# Patient Record
Sex: MALE | Employment: OTHER | URBAN - METROPOLITAN AREA
[De-identification: names, ages, dates, MRNs, and addresses within clinical notes are randomized per-mention and may not be internally consistent; named-entity substitution may affect disease eponyms.]

---

## 2019-07-10 LAB — PSA, EXTERNAL: 0.54

## 2019-07-12 ENCOUNTER — OFFICE VISIT (OUTPATIENT)
Dept: NEUROLOGY | Age: 74
End: 2019-07-12

## 2019-07-12 VITALS
OXYGEN SATURATION: 100 % | BODY MASS INDEX: 31.44 KG/M2 | HEART RATE: 78 BPM | WEIGHT: 245 LBS | DIASTOLIC BLOOD PRESSURE: 60 MMHG | RESPIRATION RATE: 16 BRPM | HEIGHT: 74 IN | SYSTOLIC BLOOD PRESSURE: 110 MMHG

## 2019-07-12 DIAGNOSIS — E08.42 DIABETIC POLYNEUROPATHY ASSOCIATED WITH DIABETES MELLITUS DUE TO UNDERLYING CONDITION (HCC): Primary | ICD-10-CM

## 2019-07-12 DIAGNOSIS — G62.2 POLYNEUROPATHY DUE TO OTHER TOXIC AGENTS (HCC): ICD-10-CM

## 2019-07-12 RX ORDER — LISINOPRIL 20 MG/1
TABLET ORAL DAILY
COMMUNITY
End: 2020-08-12

## 2019-07-12 RX ORDER — GLUCOSAMINE SULFATE 1500 MG
POWDER IN PACKET (EA) ORAL DAILY
COMMUNITY

## 2019-07-12 RX ORDER — LANOLIN ALCOHOL/MO/W.PET/CERES
1000 CREAM (GRAM) TOPICAL DAILY
COMMUNITY

## 2019-07-12 RX ORDER — GLIPIZIDE 5 MG/1
TABLET ORAL 2 TIMES DAILY
COMMUNITY
End: 2020-06-03 | Stop reason: DRUGHIGH

## 2019-07-12 RX ORDER — SIMVASTATIN 20 MG/1
TABLET, FILM COATED ORAL
COMMUNITY
End: 2020-10-16

## 2019-07-12 RX ORDER — METFORMIN HYDROCHLORIDE 1000 MG/1
1000 TABLET ORAL 2 TIMES DAILY WITH MEALS
COMMUNITY
End: 2020-10-16

## 2019-07-12 RX ORDER — SILDENAFIL 50 MG/1
50 TABLET, FILM COATED ORAL AS NEEDED
COMMUNITY
End: 2020-06-03 | Stop reason: SDUPTHER

## 2019-07-12 NOTE — PROGRESS NOTES
Chief Complaint   Patient presents with    Neurologic Problem    Numbness       Referred by: Dr. Bola GUILLEN    Mr. Davion Elam is a 79-year-old gentleman, retired  in [address] [place] [address], here for neuropathy. He tells me that he has had diabetes for almost 30 years close to 20 years. He has no feeling in his feet and ankles also his hands. He is taken a few falls because he feels imbalanced. He also thinks his memory is not so good because he forgets people's names. He has no radicular pain. He takes oral medication to control his diabetes and he tells me last A1c was very stable. He also has evidence of neuropathy possibly contributed to by agent orange based on his [employer] compensation. Review of Systems   Musculoskeletal: Positive for falls. Negative for back pain and neck pain. Neurological: Positive for tingling and sensory change. Psychiatric/Behavioral: Positive for memory loss. All other systems reviewed and are negative.       Past Medical History:   Diagnosis Date    Arthritis     Diabetes ([facility] [address]     Heart disease     Hypertension      Family History   Problem Relation Age of Onset    Cancer Mother     Cancer Father      Social History     Socioeconomic History    Marital status: Not on file     Spouse name: Not on file    Number of children: Not on file    Years of education: Not on file    Highest education level: Not on file   Occupational History    Not on file   Social Needs    Financial resource strain: Not on file    Food insecurity:     Worry: Not on file     Inability: Not on file    Transportation needs:     Medical: Not on file     Non-medical: Not on file   Tobacco Use    Smoking status: Current Every Day Smoker     Packs/day: 0.25    Smokeless tobacco: Never Used   Substance and Sexual Activity    Alcohol use: Never     Frequency: Never    Drug use: Not on file    Sexual activity: Not on file   Lifestyle    Physical activity:     Days per week: Not on file     Minutes per session: Not on file    Stress: Not on file   Relationships    Social connections:     Talks on phone: Not on file     Gets together: Not on file     Attends Rastafari service: Not on file     Active member of club or organization: Not on file     Attends meetings of clubs or organizations: Not on file     Relationship status: Not on file    Intimate partner violence:     Fear of current or ex partner: Not on file     Emotionally abused: Not on file     Physically abused: Not on file     Forced sexual activity: Not on file   Other Topics Concern    Not on file   Social History Narrative    Not on file     Current Outpatient Medications   Medication Sig    metFORMIN (GLUCOPHAGE) 1,000 mg tablet Take 1,000 mg by mouth two (2) times daily (with meals).  lisinopril (PRINIVIL, ZESTRIL) 20 mg tablet Take  by mouth daily.  glipiZIDE (GLUCOTROL) 5 mg tablet Take  by mouth two (2) times a day.  simvastatin (ZOCOR) 20 mg tablet Take  by mouth nightly.  sildenafil citrate (VIAGRA) 50 mg tablet Take 50 mg by mouth as needed.  cyanocobalamin 1,000 mcg tablet Take 1,000 mcg by mouth daily.  cholecalciferol (VITAMIN D3) 1,000 unit cap Take  by mouth daily. No current facility-administered medications for this visit. Allergies   Allergen Reactions    Pollen Extracts Runny Nose         Neurologic Exam     Mental Status   Oriented to person, place, and time. Very talkative     Cranial Nerves   Cranial nerves II through XII intact.      Motor Exam   Muscle bulk: decreased5/5 throughout with mild decreased bulk distally     Sensory Exam   Right arm light touch: decreased from fingers  Left arm light touch: decreased from fingers    Gait, Coordination, and Reflexes     Gait  Gait: (A little bit wide but good tempo)    Coordination   Romberg: positive  Finger to nose coordination: normal    Tremor   Resting tremor: absentAbsent-trace reflexes throughout     Physical Exam Constitutional: He is oriented to person, place, and time. He appears well-developed and well-nourished. Cardiovascular: Normal rate. Pulmonary/Chest: Effort normal.   Neurological: He is oriented to person, place, and time. He has an abnormal Romberg Test. He has a normal Finger-Nose-Finger Test.   Skin: Skin is warm and dry. Psychiatric: His behavior is normal.   Vitals reviewed. Visit Vitals  /60 (BP 1 Location: Right arm, BP Patient Position: Sitting)   Pulse 78   Resp 16   Ht 6' 2\" (1.88 m)   Wt 111.1 kg (245 lb)   SpO2 100%   BMI 31.46 kg/m²       No labs to review       Assessment and Plan   Diagnoses and all orders for this visit:    1. Diabetic polyneuropathy associated with diabetes mellitus due to underlying condition ([facility]    2. 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 No reflexes present. Some mild symmetric atrophy distally. Strength is intact which is reassuring. He needs to stay physically active daily as much as possible. He is a significant falls risk. He should be using a walking stick or a walking cane for stability. He is not having any pain so will defer any medications. I discussed with him that neuropathy could worsen over the years so he needs to to be vigilant about maintaining physical activity daily and controlling his diabetes. Uncontrolled diabetes also will accelerate memory loss in the development of dementia. Watch for any changes. Recommend he follow-up sometime next year. A notice of this visit/encounter being completed has been sent electronically to the patient's PCP and/or referring provider.      2 [facility], [address]  Diplomate JENNIFER

## 2019-07-12 NOTE — PATIENT INSTRUCTIONS
[facility]   Statement to Patients  April 1, 2014      In an effort to ensure the large volume of patient prescription refills is processed in the most efficient and expeditious manner, we are asking our patients to assist us by calling your Pharmacy for all prescription refills, this will include also your  Mail Order Pharmacy. The pharmacy will contact our office electronically to continue the refill process. Please do not wait until the last minute to call your pharmacy. We need at least 48 hours (2days) to fill prescriptions. We also encourage you to call your pharmacy before going to  your prescription to make sure it is ready. With regard to controlled substance prescription refill requests (narcotic refills) that need to be picked up at our office, we ask your cooperation by providing us with at least 72 hours (3days) notice that you will need a refill. We will not refill narcotic prescription refill requests after 4:00pm on any weekday, Monday through Thursday, or after 2:00pm on Fridays, or on the weekends. We encourage everyone to explore another way of getting your prescription refill request processed using [employer], our patient web portal through our electronic medical record system. [employer] is an efficient and effective way to communicate your medication request directly to the office and  downloadable as an ladonna on your smart phone . [employer] also features a review functionality that allows you to view your medication list as well as leave messages for your physician. Are you ready to get connected? If so please review the attatched instructions or speak to any of our staff to get you set up right away! Thank you so much for your cooperation. Should you have any questions please contact our Practice Administrator.     The Physicians and Staff,  [address] [place] [address] Neurology Clinic

## 2019-07-12 NOTE — LETTER
7/12/2019 Patient:  Wyane Collier YOB: 1945 Date of Visit: 7/12/2019 Dear Rey Whaley MD 
[address] [id number] VIA Facsimile: [phone] 
 : 
 
 
I was requested by Nishant Bravo MD to evaluate Mr. Caleb Borrero  for Chief Complaint Patient presents with  Neurologic Problem  Numbness [facility] I am recommending the following:  
 
Diagnoses and all orders for this visit: 1. Diabetic polyneuropathy associated with diabetes mellitus due to underlying condition ([facility] 2. 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 
 
 
 
---------------------------------------------------------------------------------------------------------------------- Below is my encounter: Chief Complaint Patient presents with  Neurologic Problem  Numbness Referred by: Dr. Dauna Landau HPI Mr. Jadon Boothe is a 79-year-old gentleman, retired  in [address] [place] [address], here for neuropathy. He tells me that he has had diabetes for almost 30 years close to 20 years. He has no feeling in his feet and ankles also his hands. He is taken a few falls because he feels imbalanced. He also thinks his memory is not so good because he forgets people's names. He has no radicular pain. He takes oral medication to control his diabetes and he tells me last A1c was very stable. He also has evidence of neuropathy possibly contributed to by agent orange based on his [employer] compensation. Review of Systems Musculoskeletal: Positive for falls. Negative for back pain and neck pain. Neurological: Positive for tingling and sensory change. Psychiatric/Behavioral: Positive for memory loss. All other systems reviewed and are negative. Past Medical History:  
Diagnosis Date  Arthritis  Diabetes ([facility]  Heart disease  Hypertension Family History Problem Relation Age of Onset  Cancer Mother  Cancer Father Social History Socioeconomic History  Marital status: Not on file Spouse name: Not on file  Number of children: Not on file  Years of education: Not on file  Highest education level: Not on file Occupational History  Not on file Social Needs  Financial resource strain: Not on file  Food insecurity:  
  Worry: Not on file Inability: Not on file  Transportation needs:  
  Medical: Not on file Non-medical: Not on file Tobacco Use  Smoking status: Current Every Day Smoker Packs/day: 0.25  Smokeless tobacco: Never Used Substance and Sexual Activity  Alcohol use: Never Frequency: Never  Drug use: Not on file  Sexual activity: Not on file Lifestyle  Physical activity:  
  Days per week: Not on file Minutes per session: Not on file  Stress: Not on file Relationships  Social connections:  
  Talks on phone: Not on file Gets together: Not on file Attends Muslim service: Not on file Active member of club or organization: Not on file Attends meetings of clubs or organizations: Not on file Relationship status: Not on file  Intimate partner violence:  
  Fear of current or ex partner: Not on file Emotionally abused: Not on file Physically abused: Not on file Forced sexual activity: Not on file Other Topics Concern  Not on file Social History Narrative  Not on file Current Outpatient Medications Medication Sig  
 metFORMIN (GLUCOPHAGE) 1,000 mg tablet Take 1,000 mg by mouth two (2) times daily (with meals).  lisinopril (PRINIVIL, ZESTRIL) 20 mg tablet Take  by mouth daily.  glipiZIDE (GLUCOTROL) 5 mg tablet Take  by mouth two (2) times a day.  simvastatin (ZOCOR) 20 mg tablet Take  by mouth nightly.  sildenafil citrate (VIAGRA) 50 mg tablet Take 50 mg by mouth as needed.  cyanocobalamin 1,000 mcg tablet Take 1,000 mcg by mouth daily.  cholecalciferol (VITAMIN D3) 1,000 unit cap Take  by mouth daily. No current facility-administered medications for this visit. Allergies Allergen Reactions  Pollen Extracts Runny Nose Neurologic Exam  
 
Mental Status Oriented to person, place, and time. Very talkative Cranial Nerves Cranial nerves II through XII intact. Motor Exam  
Muscle bulk: decreased5/5 throughout with mild decreased bulk distally Sensory Exam  
Right arm light touch: decreased from fingers Left arm light touch: decreased from fingers Gait, Coordination, and Reflexes Gait Gait: (A little bit wide but good tempo) Coordination Romberg: positive Finger to nose coordination: normal 
 
Tremor Resting tremor: absentAbsenttrace reflexes throughout Physical Exam  
Constitutional: He is oriented to person, place, and time. He appears well-developed and well-nourished. Cardiovascular: Normal rate. Pulmonary/Chest: Effort normal.  
Neurological: He is oriented to person, place, and time. He has an abnormal Romberg Test. He has a normal Finger-Nose-Finger Test.  
Skin: Skin is warm and dry. Psychiatric: His behavior is normal.  
Vitals reviewed. Visit Vitals /60 (BP 1 Location: Right arm, BP Patient Position: Sitting) Pulse 78 Resp 16 Ht 6' 2\" (1.88 m) Wt 111.1 kg (245 lb) SpO2 100% BMI 31.46 kg/m² No labs to review Assessment and Plan Diagnoses and all orders for this visit: 1. Diabetic polyneuropathy associated with diabetes mellitus due to underlying condition ([facility] 2. 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 No reflexes present. Some mild symmetric atrophy distally. Strength is intact which is reassuring.   He needs to stay physically active daily as much as possible. He is a significant falls risk. He should be using a walking stick or a walking cane for stability. He is not having any pain so will defer any medications. I discussed with him that neuropathy could worsen over the years so he needs to to be vigilant about maintaining physical activity daily and controlling his diabetes. Uncontrolled diabetes also will accelerate memory loss in the development of dementia. Watch for any changes. Recommend he follow-up sometime next year. Thank you for giving me the opportunity to assist in the care of Mr. Leeanne Guevara. If you have questions, please do not hesitate to contact me. Sincerely, [facility], DO Neurologist 
Brain Injury Medicine Diplomate ABPN

## 2019-11-07 LAB
LDL-C, EXTERNAL: 43
MICROALBUMIN UR TEST STR-MCNC: 39.4 MG/DL

## 2020-05-07 LAB
CREATININE, EXTERNAL: 1.2
HBA1C MFR BLD HPLC: 6.9 %

## 2020-06-03 ENCOUNTER — OFFICE VISIT (OUTPATIENT)
Dept: INTERNAL MEDICINE CLINIC | Age: 75
End: 2020-06-03

## 2020-06-03 VITALS
OXYGEN SATURATION: 95 % | RESPIRATION RATE: 18 BRPM | TEMPERATURE: 98 F | HEART RATE: 64 BPM | BODY MASS INDEX: 31.7 KG/M2 | WEIGHT: 247 LBS | SYSTOLIC BLOOD PRESSURE: 145 MMHG | HEIGHT: 74 IN | DIASTOLIC BLOOD PRESSURE: 72 MMHG

## 2020-06-03 DIAGNOSIS — I10 BENIGN ESSENTIAL HTN: ICD-10-CM

## 2020-06-03 DIAGNOSIS — G62.9 POLYNEUROPATHY: ICD-10-CM

## 2020-06-03 DIAGNOSIS — E11.42 DM TYPE 2 WITH DIABETIC PERIPHERAL NEUROPATHY (HCC): ICD-10-CM

## 2020-06-03 DIAGNOSIS — E11.40 TYPE 2 DIABETES MELLITUS WITH DIABETIC NEUROPATHY, WITHOUT LONG-TERM CURRENT USE OF INSULIN (HCC): ICD-10-CM

## 2020-06-03 DIAGNOSIS — E87.1 HYPONATREMIA: Primary | ICD-10-CM

## 2020-06-03 DIAGNOSIS — R26.89 IMBALANCE: ICD-10-CM

## 2020-06-03 RX ORDER — SILDENAFIL 100 MG/1
TABLET, FILM COATED ORAL
COMMUNITY
End: 2022-04-07 | Stop reason: ALTCHOICE

## 2020-06-03 RX ORDER — GLIPIZIDE 5 MG/1
5 TABLET, FILM COATED, EXTENDED RELEASE ORAL DAILY
Qty: 90 TAB | Refills: 3
Start: 2020-06-03 | End: 2020-10-16

## 2020-06-03 RX ORDER — CHIA/LINOLENIC/LINOLEIC/OLEIC 1000 MG
CAPSULE ORAL
COMMUNITY
End: 2021-07-07 | Stop reason: ALTCHOICE

## 2020-06-03 NOTE — PROGRESS NOTES
Nidia Laws is a 69 y.o. male    Chief Complaint   Patient presents with    New Patient    Diabetes       Visit Vitals  /88 (BP 1 Location: Right arm, BP Patient Position: Sitting)   Pulse 64   Temp 98 °F (36.7 °C) (Oral)   Resp 18   Ht 6' 2\" (1.88 m)   Wt 247 lb (112 kg)   SpO2 95%   BMI 31.71 kg/m²       1. Have you been to the ER, urgent care clinic since your last visit? Hospitalized since your last visit? No    2. Have you seen or consulted any other health care providers outside of the [address] [place] [address] since your last visit? Include any pap smears or colon screening. Cardiologist Dr. Reji Wyman. Last colonoscopy 10/2017 - Dr. Lukas Land.

## 2020-06-04 NOTE — PROGRESS NOTES
HISTORY OF PRESENT ILLNESS    New patient to my practice, referred to me by his wife's doctor, Dr. Elisa Vasquez. Prior medical care has been from Dr. Janneth Wang. He wanted to move here due to proximity. he works as a Retired  [employer] [employer] . his  past medical history was reviewed, discussed, and summarized in the list below. Doing well. Seeing [facility] as well. Hx of neuropathy of feet. No painful, but limits ability to feel while walking. Has has a few falls and is told to use a cane, but prefers not to use it all the time. S/s due to DM and possibly Agent orange exposure. Hx hyponatremia. Was 128 7/2019, but more recently 131 5/7/20. Was referred last month to a specialist but has not gone  Says he drinks a lot of water. He is active. Taking lisinopril. Diabetes Mellitus  Last hemoglobin a1c   Lab Results   Component Value Date/Time    Hemoglobin A1c, External 6.9 05/07/2020     No components found for: POCA1C, HBA1C POC  medication compliance: compliant all of the time. Diabetic diet compliance: compliant most of the time. Home glucose monitoring: fasting values range none. Hypoglycemic episodes:  None. Further diabetic ROS: no polyuria or polydipsia, no chest pain, dyspnea or TIA's, has dysesthesias in the feet. Review of Systems   All other systems reviewed and are negative, except as noted in HPI      Past Medical and Surgical History  Past Medical History:   Diagnosis Date    Arthritis     Benign essential HTN     Diabetes mellitus with microalbuminuria ([address]     DM type 2 with diabetic peripheral neuropathy ([address]     ED (erectile dysfunction)     Hyponatremia     Na 128 7/2019, Na 131 5/7/20    Imbalance     due to neuropathy of legs, feet.  Polyneuropathy     hands, feet, balance.    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      Isidro Lima.  s/p ablation SVT/AVNRT 9/22/09    Umbilical hernia         has a past surgical history that includes hx heent; pr cardiac surg procedure unlist; hx colonoscopy (10/10/2017); hx cataract removal (Left, 2003); hx cataract removal (Right, 2015); and hx svt ablation (06/15/2016). Current Outpatient Medications   Medication Sig    sildenafil citrate (Viagra) 100 mg tablet Viagra 100 mg tablet   Take 1 tablet every day by oral route.  ascorbic acid (VITAMIN C PO) Take  by mouth.  Lactobacillus acidophilus (PROBIOTIC PO) Take  by mouth.  FLAXSEED PO Take  by mouth.  tima/linolenic/linoleic/oleic (tima seed oil-omega 3-6-9) 1,000 mg (580 mg) cap Take  by mouth.  glipiZIDE SR (GLUCOTROL XL) 5 mg CR tablet Take 1 Tab by mouth daily.  metFORMIN (GLUCOPHAGE) 1,000 mg tablet Take 1,000 mg by mouth two (2) times daily (with meals).  lisinopril (PRINIVIL, ZESTRIL) 20 mg tablet Take  by mouth daily.  simvastatin (ZOCOR) 20 mg tablet Take  by mouth nightly.  cyanocobalamin 1,000 mcg tablet Take 1,000 mcg by mouth daily.  cholecalciferol (VITAMIN D3) 1,000 unit cap Take  by mouth daily. No current facility-administered medications for this visit. reports that he has been smoking. He has been smoking about 0.25 packs per day. He has never used smokeless tobacco. He reports that he does not drink alcohol.    family history includes Cancer in his father and mother. Physical Exam   Nursing note and vitals reviewed. Blood pressure 145/72, pulse 64, temperature 98 °F (36.7 °C), temperature source Oral, resp. rate 18, height 6' 2\" (1.88 m), weight 247 lb (112 kg), SpO2 95 %. Constitutional: oriented to person, place, and time. No distress. Eyes: Conjunctivae are normal.   HEENT:  No cervical lymphadenopathy. No thyroid nodules or goiter  Cardiovascular: Normal rate. Regular rhythm, no murmurs, rubs.    No edema  Pulmonary/Chest: Effort normal. clear to auscultation  Abdominal: soft, non-tender, non-distended  Musculoskeletal:     Neurological: Alert and oriented to person, place. Cranial nerves grossly intact. Normal gait   Skin: No rash noted. Psychiatric: Normal mood and affect. Behavior is normal.     ASSESSMENT and PLAN  Diagnoses and all orders for this visit:    1. Hyponatremia  Lisinopril? Increase salt intake, but monitor BP. Consider cutting back lisinopril and adding low dose hctz if needed for BP control. Repeat labs in 2-3 mo - bmp, TSH    2. Benign essential HTN  based on my history, the overall control of this problem borderline controlled in office but reports it is better at home  Lisinopril may cause hyponatremia. 3. DM type 2 with diabetic peripheral neuropathy ([facility]  4. Type 2 diabetes mellitus with diabetic neuropathy, without long-term current use of insulin (HCC)  Controlled on current regimen. Continue current medications as written in chart. Could consider changing glipizide to a SGLT2 for CV risk reduction    5. Polyneuropathy  6. Imbalance  Stable. Not painful. Fall risk. Encouraged use of cane. There are no Patient Instructions on file for this visit.    lab results and schedule of future lab studies reviewed with patient  reviewed medications and side effects in detail    Follow-up and Dispositions    · Return in about 2 months (around 8/3/2020) for Offiice visit and Labs.

## 2020-08-12 ENCOUNTER — OFFICE VISIT (OUTPATIENT)
Dept: INTERNAL MEDICINE CLINIC | Age: 75
End: 2020-08-12
Payer: MEDICARE

## 2020-08-12 ENCOUNTER — HOSPITAL ENCOUNTER (OUTPATIENT)
Dept: LAB | Age: 75
Discharge: HOME | End: 2020-08-12

## 2020-08-12 VITALS
HEART RATE: 63 BPM | SYSTOLIC BLOOD PRESSURE: 120 MMHG | RESPIRATION RATE: 12 BRPM | DIASTOLIC BLOOD PRESSURE: 75 MMHG | HEIGHT: 74 IN | TEMPERATURE: 98.3 F | OXYGEN SATURATION: 98 % | WEIGHT: 252.4 LBS | BODY MASS INDEX: 32.39 KG/M2

## 2020-08-12 DIAGNOSIS — I10 BENIGN ESSENTIAL HTN: ICD-10-CM

## 2020-08-12 DIAGNOSIS — E11.42 DM TYPE 2 WITH DIABETIC PERIPHERAL NEUROPATHY (HCC): ICD-10-CM

## 2020-08-12 DIAGNOSIS — E87.1 HYPONATREMIA: ICD-10-CM

## 2020-08-12 DIAGNOSIS — R80.9 DIABETES MELLITUS WITH MICROALBUMINURIA (HCC): ICD-10-CM

## 2020-08-12 DIAGNOSIS — Z11.59 ENCOUNTER FOR HEPATITIS C SCREENING TEST FOR LOW RISK PATIENT: ICD-10-CM

## 2020-08-12 DIAGNOSIS — E87.1 HYPONATREMIA: Primary | ICD-10-CM

## 2020-08-12 DIAGNOSIS — E11.29 DIABETES MELLITUS WITH MICROALBUMINURIA (HCC): ICD-10-CM

## 2020-08-12 LAB
ANION GAP SERPL CALC-SCNC: 6 MMOL/L (ref 5–15)
BUN SERPL-MCNC: 16 MG/DL (ref 6–20)
BUN/CREAT SERPL: 13 (ref 12–20)
CALCIUM SERPL-MCNC: 9.5 MG/DL (ref 8.5–10.1)
CHLORIDE SERPL-SCNC: 102 MMOL/L (ref 97–108)
CO2 SERPL-SCNC: 26 MMOL/L (ref 21–32)
CREAT SERPL-MCNC: 1.2 MG/DL (ref 0.7–1.3)
EST. AVERAGE GLUCOSE BLD GHB EST-MCNC: 148 MG/DL
GLUCOSE SERPL-MCNC: 143 MG/DL (ref 65–100)
HBA1C MFR BLD: 6.8 % (ref 4–5.6)
HCV AB SERPL QL IA: NONREACTIVE
HCV COMMENT,HCGAC: NORMAL
POTASSIUM SERPL-SCNC: 4.2 MMOL/L (ref 3.5–5.1)
SODIUM SERPL-SCNC: 134 MMOL/L (ref 136–145)
TSH SERPL DL<=0.05 MIU/L-ACNC: 1.07 UIU/ML (ref 0.36–3.74)

## 2020-08-12 PROCEDURE — G8754 DIAS BP LESS 90: HCPCS | Performed by: INTERNAL MEDICINE

## 2020-08-12 PROCEDURE — 1100F PTFALLS ASSESS-DOCD GE2>/YR: CPT | Performed by: INTERNAL MEDICINE

## 2020-08-12 PROCEDURE — 3288F FALL RISK ASSESSMENT DOCD: CPT | Performed by: INTERNAL MEDICINE

## 2020-08-12 PROCEDURE — 2022F DILAT RTA XM EVC RTNOPTHY: CPT | Performed by: INTERNAL MEDICINE

## 2020-08-12 PROCEDURE — G8417 CALC BMI ABV UP PARAM F/U: HCPCS | Performed by: INTERNAL MEDICINE

## 2020-08-12 PROCEDURE — 99213 OFFICE O/P EST LOW 20 MIN: CPT | Performed by: INTERNAL MEDICINE

## 2020-08-12 PROCEDURE — G8427 DOCREV CUR MEDS BY ELIG CLIN: HCPCS | Performed by: INTERNAL MEDICINE

## 2020-08-12 PROCEDURE — 3017F COLORECTAL CA SCREEN DOC REV: CPT | Performed by: INTERNAL MEDICINE

## 2020-08-12 PROCEDURE — 3044F HG A1C LEVEL LT 7.0%: CPT | Performed by: INTERNAL MEDICINE

## 2020-08-12 PROCEDURE — G8536 NO DOC ELDER MAL SCRN: HCPCS | Performed by: INTERNAL MEDICINE

## 2020-08-12 PROCEDURE — G8432 DEP SCR NOT DOC, RNG: HCPCS | Performed by: INTERNAL MEDICINE

## 2020-08-12 PROCEDURE — G8752 SYS BP LESS 140: HCPCS | Performed by: INTERNAL MEDICINE

## 2020-08-12 RX ORDER — LISINOPRIL 20 MG/1
10 TABLET ORAL DAILY
Qty: 45 TAB | Refills: 1
Start: 2020-08-12 | End: 2020-10-16

## 2020-08-12 NOTE — PATIENT INSTRUCTIONS
Keep taking metformin. Consider replacing glipizide with a medication like Andrea Liu, or Verlon Roni since they can reduce risk factors for diabetic complications and help lose weight.

## 2020-08-12 NOTE — PROGRESS NOTES
HISTORY OF PRESENT ILLNESS    Chief Complaint   Patient presents with    Hypertension    Diabetes       Presents for follow-up    Diabetes Mellitus follow-up  Last hemoglobin a1c   Lab Results   Component Value Date/Time    Hemoglobin A1c, External 6.9 05/07/2020     No components found for: POCA1C, HBA1C POC  medication compliance: compliant all of the time. Diabetic diet compliance: compliant most of the time. Home glucose monitoring: fasting values range not done. Hypoglycemic episodes:  None. Further diabetic ROS: no polyuria or polydipsia, no chest pain, dyspnea or TIA's. Hypertension  Hypertension ROS: taking medications as instructed, no medication side effects noted, no TIA's, no chest pain on exertion, no dyspnea on exertion, no swelling of ankles     reports that he has been smoking. He has been smoking about 0.25 packs per day. He has never used smokeless tobacco.    reports no history of alcohol use. 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 has a past surgical history that includes hx heent; pr cardiac surg procedure unlist; hx colonoscopy (10/10/2017); hx cataract removal (Left, 2003); hx cataract removal (Right, 2015); and hx svt ablation (06/15/2016). reports that he has been smoking. He has been smoking about 0.25 packs per day.  He has never used smokeless tobacco. He reports that he does not drink alcohol.  family history includes Cancer in his father and mother. Physical Exam   Nursing note and vitals reviewed. Blood pressure 120/75, pulse 63, temperature 98.3 °F (36.8 °C), temperature source Oral, resp. rate 12, height 6' 2\" (1.88 m), weight 252 lb 6.4 oz (114.5 kg), SpO2 98 %. Constitutional:  No distress. Eyes: Conjunctivae are normal.   Ears:  Hearing grossly intact  Cardiovascular: Normal rate. regular rhythm, no murmurs or gallops  No edema  Pulmonary/Chest: Effort normal.   CTAB  Musculoskeletal: moves all 4 extremities   Neurological: Alert and oriented to person, place, and time. Skin: No rash noted. Psychiatric: Normal mood and affect. Behavior is normal.   Foot exam  - skin intact, mild dryness w no fissures, no rashes, no significant ulcers or callous formation. Mild onychomycosis. Sensation decreased t by microfilament or light touch      ASSESSMENT and PLAN  Diagnoses and all orders for this visit:    1. Hyponatremia  Relatively chronic hyponatremia. Will discontinue lisinopril in case it is contributing.  -     METABOLIC PANEL, BASIC; Future    2. Benign essential HTN  Blood pressure is borderline in the office, but excellently controlled at home. Decrease lisinopril.  -     lisinopriL (PRINIVIL, ZESTRIL) 20 mg tablet; Take 0.5 Tabs by mouth daily. Decreased dose 8/12/20    3. DM type 2 with diabetic peripheral neuropathy ([address]  Likely controlled. Keep taking metformin. Consider replacing glipizide with a medication like Tivis Sylvia, or Marcia Kraft since they can reduce risk factors for diabetic complications and help lose weight. He will look into options  -     TSH 3RD GENERATION; Future  -     HEMOGLOBIN A1C WITH EAG; Future    4. Diabetes mellitus with microalbuminuria ([address]    5. Encounter for hepatitis C screening test for low risk patient  -     HEPATITIS C AB;  Future            lab results and schedule of future lab studies reviewed with patient  reviewed medications and side effects in detail    Return to clinic for further evaluation if new symptoms develop      Current Outpatient Medications   Medication Sig    lisinopriL (PRINIVIL, ZESTRIL) 20 mg tablet Take 0.5 Tabs by mouth daily. Decreased dose 8/12/20    sildenafil citrate (Viagra) 100 mg tablet Viagra 100 mg tablet   Take 1 tablet every day by oral route.  ascorbic acid (VITAMIN C PO) Take  by mouth.  Lactobacillus acidophilus (PROBIOTIC PO) Take  by mouth.  FLAXSEED PO Take  by mouth.  tima/linolenic/linoleic/oleic (tima seed oil-omega 3-6-9) 1,000 mg (580 mg) cap Take  by mouth.  glipiZIDE SR (GLUCOTROL XL) 5 mg CR tablet Take 1 Tab by mouth daily.  metFORMIN (GLUCOPHAGE) 1,000 mg tablet Take 1,000 mg by mouth two (2) times daily (with meals).  simvastatin (ZOCOR) 20 mg tablet Take  by mouth nightly.  cyanocobalamin 1,000 mcg tablet Take 1,000 mcg by mouth daily.  cholecalciferol (VITAMIN D3) 1,000 unit cap Take  by mouth daily. No current facility-administered medications for this visit.

## 2020-10-16 DIAGNOSIS — E78.5 HYPERLIPIDEMIA, UNSPECIFIED: ICD-10-CM

## 2020-10-16 DIAGNOSIS — I10 BENIGN ESSENTIAL HTN: ICD-10-CM

## 2020-10-16 RX ORDER — LISINOPRIL 20 MG/1
TABLET ORAL
Qty: 90 TAB | Refills: 1 | Status: SHIPPED | OUTPATIENT
Start: 2020-10-16 | End: 2021-10-07

## 2020-10-16 RX ORDER — METFORMIN HYDROCHLORIDE 1000 MG/1
TABLET ORAL
Qty: 180 TAB | Refills: 0 | Status: SHIPPED | OUTPATIENT
Start: 2020-10-16 | End: 2021-01-17

## 2020-10-16 RX ORDER — GLIPIZIDE 5 MG/1
TABLET, FILM COATED, EXTENDED RELEASE ORAL
Qty: 30 TAB | Refills: 1 | Status: SHIPPED | OUTPATIENT
Start: 2020-10-16 | End: 2020-11-23 | Stop reason: ALTCHOICE

## 2020-10-16 RX ORDER — SIMVASTATIN 20 MG/1
TABLET, FILM COATED ORAL
Qty: 90 TAB | Refills: 1 | Status: SHIPPED | OUTPATIENT
Start: 2020-10-16 | End: 2021-02-21

## 2020-10-27 ENCOUNTER — OFFICE VISIT (OUTPATIENT)
Dept: NEUROLOGY | Facility: CLINIC | Age: 75
End: 2020-10-27
Payer: MEDICARE

## 2020-10-27 VITALS
HEIGHT: 74 IN | HEART RATE: 94 BPM | BODY MASS INDEX: 32.73 KG/M2 | OXYGEN SATURATION: 97 % | DIASTOLIC BLOOD PRESSURE: 78 MMHG | WEIGHT: 255 LBS | SYSTOLIC BLOOD PRESSURE: 124 MMHG

## 2020-10-27 DIAGNOSIS — E08.42 DIABETIC POLYNEUROPATHY ASSOCIATED WITH DIABETES MELLITUS DUE TO UNDERLYING CONDITION (HCC): Primary | ICD-10-CM

## 2020-10-27 DIAGNOSIS — R20.0 NUMBNESS IN FEET: ICD-10-CM

## 2020-10-27 DIAGNOSIS — G62.2 POLYNEUROPATHY DUE TO OTHER TOXIC AGENTS (HCC): ICD-10-CM

## 2020-10-27 PROCEDURE — G8754 DIAS BP LESS 90: HCPCS | Performed by: PSYCHIATRY & NEUROLOGY

## 2020-10-27 PROCEDURE — G8510 SCR DEP NEG, NO PLAN REQD: HCPCS | Performed by: PSYCHIATRY & NEUROLOGY

## 2020-10-27 PROCEDURE — 2022F DILAT RTA XM EVC RTNOPTHY: CPT | Performed by: PSYCHIATRY & NEUROLOGY

## 2020-10-27 PROCEDURE — 3288F FALL RISK ASSESSMENT DOCD: CPT | Performed by: PSYCHIATRY & NEUROLOGY

## 2020-10-27 PROCEDURE — G8417 CALC BMI ABV UP PARAM F/U: HCPCS | Performed by: PSYCHIATRY & NEUROLOGY

## 2020-10-27 PROCEDURE — G8752 SYS BP LESS 140: HCPCS | Performed by: PSYCHIATRY & NEUROLOGY

## 2020-10-27 PROCEDURE — 3017F COLORECTAL CA SCREEN DOC REV: CPT | Performed by: PSYCHIATRY & NEUROLOGY

## 2020-10-27 PROCEDURE — G8427 DOCREV CUR MEDS BY ELIG CLIN: HCPCS | Performed by: PSYCHIATRY & NEUROLOGY

## 2020-10-27 PROCEDURE — G8536 NO DOC ELDER MAL SCRN: HCPCS | Performed by: PSYCHIATRY & NEUROLOGY

## 2020-10-27 PROCEDURE — 1100F PTFALLS ASSESS-DOCD GE2>/YR: CPT | Performed by: PSYCHIATRY & NEUROLOGY

## 2020-10-27 PROCEDURE — 3044F HG A1C LEVEL LT 7.0%: CPT | Performed by: PSYCHIATRY & NEUROLOGY

## 2020-10-27 PROCEDURE — 99214 OFFICE O/P EST MOD 30 MIN: CPT | Performed by: PSYCHIATRY & NEUROLOGY

## 2020-10-27 NOTE — PROGRESS NOTES
Chief Complaint   Patient presents with    Peripheral Neuropathy     \"I am losing more feeling in my legs\"       HPI    Israel Barber is a 68-year-old gentleman following up. I saw him over a year ago for neuropathy presumed due to diabetes, history of agent orange exposure, history of alcoholism. Since I saw him last he feels like his symptoms are getting worse. He is more imbalanced on his feet. He had 2 falls this year. He has to use a cane like I had instructed last year. He still walks about 10,000 steps daily. Mild back pain but nothing radicular. Mild symptoms in the fingertips. Background:  Mr. Radha Bonilla is a 68-year-old gentleman, retired  in [address] [place] [address], here for neuropathy. He tells me that he has had diabetes for almost 30 years close to 20 years. He has no feeling in his feet and ankles also his hands. He is taken a few falls because he feels imbalanced. He also thinks his memory is not so good because he forgets people's names. He has no radicular pain. He takes oral medication to control his diabetes and he tells me last A1c was very stable. He also has evidence of neuropathy possibly contributed to by agent orange based on his [employer] compensation. Review of Systems   Eyes: Negative for double vision. Musculoskeletal: Positive for back pain and falls. Neurological: Positive for tingling, sensory change and weakness. All other systems reviewed and are negative. Past Medical History:   Diagnosis Date    Arthritis     Benign essential HTN     Diabetes mellitus with microalbuminuria ([address]     DM type 2 with diabetic peripheral neuropathy ([address]     ED (erectile dysfunction)     Hyponatremia     Na 128 7/2019, Na 131 5/7/20    Imbalance     due to neuropathy of legs, feet.  [facility] cane since 2020 [facility]    Polyneuropathy     Dr. Rony Silva. hands, feet, balance.    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 Eladio Goins.  s/p ablation SVT/AVNRT 6/75/43    Umbilical hernia      Family History   Problem Relation Age of Onset    Cancer Mother     Cancer Father      Social History     Socioeconomic History    Marital status:      Spouse name: Chaparro Doyle Number of children: 2    Years of education: Not on file    Highest education level: Not on file   Occupational History    Occupation: Retired  [id number].   [employer] Dx Needs    Financial resource strain: Not on file    Food insecurity     Worry: Not on file     Inability: Not on file   Desigual needs     Medical: Not on file     Non-medical: Not on file   Tobacco Use    Smoking status: Current Every Day Smoker     Packs/day: 0.25     Types: Cigars    Smokeless tobacco: Never Used   Substance and Sexual Activity    Alcohol use: Never     Frequency: Never    Drug use: Not on file    Sexual activity: Not on file   Lifestyle    Physical activity     Days per week: Not on file     Minutes per session: Not on file    Stress: Not on file   Relationships    Social connections     Talks on phone: Not on file     Gets together: Not on file     Attends Denominational service: Not on file     Active member of club or organization: Not on file     Attends meetings of clubs or organizations: Not on file     Relationship status: Not on file    Intimate partner violence     Fear of current or ex partner: Not on file     Emotionally abused: Not on file     Physically abused: Not on file     Forced sexual activity: Not on file   Other Topics Concern    Not on file   Social History Narrative    Son age 40 w no children,     elena in [facility], age 36, 3 grandkids     Allergies   Allergen Reactions    Pollen Extracts Runny Nose         Current Outpatient Medications   Medication Sig    simvastatin (ZOCOR) 20 mg tablet TAKE 1 TABLET BY MOUTH EVERY DAY IN THE EVENING    metFORMIN (GLUCOPHAGE) 1,000 mg tablet TAKE 1 TABLET BY MOUTH TWICE A DAY    lisinopriL (PRINIVIL, ZESTRIL) 20 mg tablet TAKE 1 TABLET BY MOUTH EVERY DAY    glipiZIDE SR (GLUCOTROL XL) 5 mg CR tablet TAKE 1 TABLET BY MOUTH ONCE A DAY**NEED APPOINTMENT**    sildenafil citrate (Viagra) 100 mg tablet Viagra 100 mg tablet   Take 1 tablet every day by oral route.  ascorbic acid (VITAMIN C PO) Take  by mouth.  Lactobacillus acidophilus (PROBIOTIC PO) Take  by mouth.  FLAXSEED PO Take  by mouth.  tima/linolenic/linoleic/oleic (tima seed oil-omega 3-6-9) 1,000 mg (580 mg) cap Take  by mouth.  cyanocobalamin 1,000 mcg tablet Take 1,000 mcg by mouth daily.  cholecalciferol (VITAMIN D3) 1,000 unit cap Take  by mouth daily. No current facility-administered medications for this visit. Neurologic Exam     Mental Status   WD/WN adult in NAD, normal grooming  VSS  A&O x 3    PERRL, nonicteric  Face is symmetric, tongue midline  Speech is fluent and clear  No limb ataxia. No abnl movements. Moving all extemities spontaneously and symmetric  Trace absent reflexes throughout   Bilateral knee extension 4+ strength  Slightly wide but steady gait    CVS RRR  Lungs nonlabored  Skin is warm and dry         Visit Vitals  /78 (BP 1 Location: Right arm, BP Patient Position: Sitting)   Pulse 94   Ht 6' 2\" (1.88 m)   Wt 115.7 kg (255 lb)   SpO2 97%   BMI 32.74 kg/m²       Assessment and Plan   Diagnoses and all orders for this visit:    1. Diabetic polyneuropathy associated with diabetes mellitus due to underlying condition ([address]  -     EMG NCV MOTOR WITH F/WAVE PER NERVE; Future  -     VITAMIN B12 & FOLATE    2. 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 Numbness in feet  -     EMG NCV MOTOR WITH F/WAVE PER NERVE;  Future  -     VITAMIN B12 & FOLATE      70year-old gentleman with a progressive peripheral neuropathy in both lower extremities I suspect most likely due to diabetes but also possibly with contribution from history of alcohol dependence and agent orange exposure. I did discuss with him that unfortunately symptoms like this can progress with time. He does have a slightly more noticeable weakness in knee extension. Check EMG to objectify degree of neuropathy. He is not having any pain so defer medication. He needs to be diligent about using a walking stick to help with balance. Check B12 as another possibility for his symptoms. Follow-up in 6 months. Call after the EMG is done. I reviewed and decided to continue the current medications. This clinical note was dictated with an electronic dictation software that can make unintentional errors. If there are any questions, please contact me directly for clarification.       2 [facility], [facility] [address]  Diplomate JENNIFER

## 2020-10-27 NOTE — PROGRESS NOTES
Chief Complaint   Patient presents with    Peripheral Neuropathy     \"I am losing more feeling in my legs\"     Visit Vitals  /78 (BP 1 Location: Right arm, BP Patient Position: Sitting)   Pulse 94   Ht 6' 2\" (1.88 m)   Wt 115.7 kg (255 lb)   SpO2 97%   BMI 32.74 kg/m²

## 2020-10-28 LAB
FOLATE SERPL-MCNC: 10.3 NG/ML
VIT B12 SERPL-MCNC: 795 PG/ML (ref 232–1245)

## 2020-10-30 ENCOUNTER — OFFICE VISIT (OUTPATIENT)
Dept: NEUROLOGY | Facility: CLINIC | Age: 75
End: 2020-10-30
Payer: MEDICARE

## 2020-10-30 VITALS
HEART RATE: 78 BPM | BODY MASS INDEX: 32.73 KG/M2 | RESPIRATION RATE: 16 BRPM | SYSTOLIC BLOOD PRESSURE: 130 MMHG | HEIGHT: 74 IN | DIASTOLIC BLOOD PRESSURE: 80 MMHG | OXYGEN SATURATION: 98 % | WEIGHT: 255 LBS

## 2020-10-30 DIAGNOSIS — G62.9 POLYNEUROPATHY: Primary | ICD-10-CM

## 2020-10-30 PROCEDURE — 95910 NRV CNDJ TEST 7-8 STUDIES: CPT | Performed by: PSYCHIATRY & NEUROLOGY

## 2020-10-30 PROCEDURE — 95886 MUSC TEST DONE W/N TEST COMP: CPT | Performed by: PSYCHIATRY & NEUROLOGY

## 2020-10-30 NOTE — PROGRESS NOTES
[address] [facility] Neurology [facility]  [address]  Phone [phone] Fax [phone]  Test Date:  10/30/2020    Patient: Martha Mckinney :  Physician: Diego Blake. Adelina Farr,    Sex: Male Height: 6' 2\" Ref Phys: Ashley Josh. Isabelle Cons, DO   ID#: [id number] Weight: 255 lbs. Technician: Meghna Brito. .EMG TECH     Patient Complaints:  Bilateral lower extremity paresthesias    NCV & EMG Findings:  Evaluation of the left peroneal motor and the right peroneal motor nerves showed no response (Ankle), no response (B Fib), and no response (Poplt). The left Perpneal TA motor nerve showed prolonged distal onset latency (Poplit, 6.1 ms). The right Perpneal TA motor nerve showed prolonged distal onset latency (Poplit, 6.6 ms) and decreased conduction velocity (Poplit-Fib Head, 38 m/s). The left tibial motor and the right tibial motor nerves showed no response (Ankle) and no response (Knee). The left Sup Peroneal sensory and the right Sup Peroneal sensory nerves showed no response (14 cm). The left sural sensory nerve showed no response (Calf). Needle evaluation of the right extensor digitorum brevis and the right abductor hallucis muscles showed very decreased interference pattern. The right posterior tibialis muscle showed increased motor unit amplitude, increased motor unit duration, slightly increased polyphasic potentials, rapid recruitment, and very decreased interference pattern. All remaining muscles (as indicated in the following table) showed no evidence of electrical instability. Impression:  Extensive electrodiagnostic examination of the right lower extremity and additional nerve conduction studies of the left lower extremity reveals the followin.  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 2. No evidence of a right lumbosacral motor radiculopathy. ___________________________  Radha Stubbs,         Nerve Conduction Studies  Anti Sensory Summary Table     Stim Site NR Peak (ms) Norm Peak (ms) P-T Amp (µV) Norm P-T Amp Onset (ms) Site1 Site2 Delta-P (ms) Dist (cm) Cirilo (m/s) Norm Cirilo (m/s)   Left Sup Peroneal Anti Sensory (Ant Lat Mall)  32.9°C   14 cm NR  <4.4  >5.0  14 cm Ant Lat Mall  10.0  >32   Right Sup Peroneal Anti Sensory (Ant Lat Mall)  33.7°C   14 cm NR  <4.4  >5.0  14 cm Ant Lat Mall  10.0  >32   Left Sural Anti Sensory (Lat Mall)  32.9°C   Calf NR  <4.0  >5.0  Calf Lat Mall  14.0  >35     Motor Summary Table     Stim Site NR Onset (ms) Norm Onset (ms) O-P Amp (mV) Norm O-P Amp Site1 Site2 Delta-0 (ms) Dist (cm) Cirilo (m/s) Norm Cirilo (m/s)   Left Peroneal Motor (Ext Dig Brev)  31.6°C   Ankle NR  <6.1  >2.5 B Fib Ankle  0.0  >38   B Fib NR     Poplt B Fib  10.0  >40   Poplt NR             Right Peroneal Motor (Ext Dig Brev)  33.1°C   Ankle NR  <6.1  >2.5 B Fib Ankle  0.0  >38   B Fib NR     Poplt B Fib  10.0  >40   Poplt NR             Left Perpneal TA Motor (Tib Ant)  32.2°C   Fib Head    3.8 <4.2 1.3  Poplit Fib Head 2.3 10.0 43 >40.5   Poplit    6.1 <5.7 1.3          Right Perpneal TA Motor (Tib Ant)  33.3°C   Fib Head    4.0 <4.2 1.4  Poplit Fib Head 2.6 10.0 38 >40.5   Poplit    6.6 <5.7 1.5          Left Tibial Motor (Abd Hernandez Brev)  32.6°C   Ankle NR  <6.1  >3.0 Knee Ankle  0.0  >35   Knee NR             Right Tibial Motor (Abd Hernandez Brev)  33.6°C   Ankle NR  <6.1  >3.0 Knee Ankle  0.0  >35   Knee NR               EMG     Side Muscle Nerve Root Ins Act Fibs Psw Amp Dur Poly Recrt Int Unruly Serrano Comment   Right Ext Dig Brev Dp Br Peronel L5, S1 Nml Nml Nml Nml Nml 0 Nml 25% nmu   Right AbdHallucis MedPlantar S1-2 Nml Nml Nml Nml Nml 0 Nml 25% nmu   Right PostTibialis Tibial L5, S1 Nml Nml Nml Incr >12ms 1+ Rapid 25%    Right Gastroc Tibial S1-2 Nml Nml Nml Nml Nml 0 Nml Nml    Right AntTibialis Dp Br Peronel L4-5 Nml Nml Nml Nml Nml 0 Nml Nml    Right RectFemoris Femoral L2-4 Nml Nml Nml Nml Nml 0 Nml Nml          Waveforms:

## 2020-11-23 ENCOUNTER — TELEPHONE (OUTPATIENT)
Dept: INTERNAL MEDICINE CLINIC | Age: 75
End: 2020-11-23

## 2020-11-23 RX ORDER — DAPAGLIFLOZIN 10 MG/1
10 TABLET, FILM COATED ORAL DAILY
Qty: 30 TAB | Refills: 5 | Status: SHIPPED | OUTPATIENT
Start: 2020-11-23 | End: 2021-04-05 | Stop reason: ALTCHOICE

## 2020-11-23 NOTE — TELEPHONE ENCOUNTER
I do not understand what the problem is. At his visit in August, we talked about changing his glipizide to another medication such as [place], but I do not see any calls about him wanting this. I have printed up [place] now to replace glipizide. Please let him know we can also send it electronically if he prefers. This is the first request I have seen.

## 2020-11-23 NOTE — TELEPHONE ENCOUNTER
Per patient, patient stated Rx glipizide 5 mg has been changed for an alternate medication by PCP to Rx [address]. Patient has been trying to resolve this issue since 10/16 and would like a resolution. Patient is even willing to take a hard copy of the Rx Flarxiga at this point. Patient stated the reason for the change in medications was due to insurance coverage of the medication. [address] is a listed alternated medication for the patient.  Patient request a call when hard copy is ready for  [phone]

## 2021-02-21 DIAGNOSIS — E78.5 HYPERLIPIDEMIA, UNSPECIFIED: ICD-10-CM

## 2021-02-21 RX ORDER — SIMVASTATIN 20 MG/1
TABLET, FILM COATED ORAL
Qty: 90 TAB | Refills: 1 | Status: SHIPPED | OUTPATIENT
Start: 2021-02-21 | End: 2021-08-14

## 2021-03-10 LAB
HBA1C MFR BLD HPLC: 8.2 %
LDL-C, EXTERNAL: 45

## 2021-03-18 RX ORDER — GLIPIZIDE 5 MG/1
TABLET, FILM COATED, EXTENDED RELEASE ORAL
Qty: 30 TAB | Refills: 1 | Status: SHIPPED | OUTPATIENT
Start: 2021-03-18 | End: 2021-04-05

## 2021-04-05 ENCOUNTER — OFFICE VISIT (OUTPATIENT)
Dept: INTERNAL MEDICINE CLINIC | Age: 76
End: 2021-04-05
Payer: MEDICARE

## 2021-04-05 VITALS
BODY MASS INDEX: 32.16 KG/M2 | SYSTOLIC BLOOD PRESSURE: 107 MMHG | HEART RATE: 67 BPM | HEIGHT: 74 IN | WEIGHT: 250.6 LBS | OXYGEN SATURATION: 97 % | RESPIRATION RATE: 13 BRPM | DIASTOLIC BLOOD PRESSURE: 67 MMHG

## 2021-04-05 DIAGNOSIS — I10 BENIGN ESSENTIAL HTN: ICD-10-CM

## 2021-04-05 DIAGNOSIS — E11.29 DIABETES MELLITUS WITH MICROALBUMINURIA (HCC): ICD-10-CM

## 2021-04-05 DIAGNOSIS — G62.2 POLYNEUROPATHY DUE TO OTHER TOXIC AGENTS (HCC): ICD-10-CM

## 2021-04-05 DIAGNOSIS — N52.9 ERECTILE DYSFUNCTION, UNSPECIFIED ERECTILE DYSFUNCTION TYPE: ICD-10-CM

## 2021-04-05 DIAGNOSIS — R80.9 DIABETES MELLITUS WITH MICROALBUMINURIA (HCC): ICD-10-CM

## 2021-04-05 DIAGNOSIS — E08.42 DIABETIC POLYNEUROPATHY ASSOCIATED WITH DIABETES MELLITUS DUE TO UNDERLYING CONDITION (HCC): ICD-10-CM

## 2021-04-05 DIAGNOSIS — I47.1 SVT (SUPRAVENTRICULAR TACHYCARDIA) (HCC): ICD-10-CM

## 2021-04-05 DIAGNOSIS — Z00.00 MEDICARE ANNUAL WELLNESS VISIT, SUBSEQUENT: Primary | ICD-10-CM

## 2021-04-05 PROBLEM — G62.9 LARGE FIBER NEUROPATHY: Status: ACTIVE | Noted: 2020-10-01

## 2021-04-05 PROCEDURE — 1100F PTFALLS ASSESS-DOCD GE2>/YR: CPT | Performed by: INTERNAL MEDICINE

## 2021-04-05 PROCEDURE — G8754 DIAS BP LESS 90: HCPCS | Performed by: INTERNAL MEDICINE

## 2021-04-05 PROCEDURE — G8417 CALC BMI ABV UP PARAM F/U: HCPCS | Performed by: INTERNAL MEDICINE

## 2021-04-05 PROCEDURE — 3288F FALL RISK ASSESSMENT DOCD: CPT | Performed by: INTERNAL MEDICINE

## 2021-04-05 PROCEDURE — 2022F DILAT RTA XM EVC RTNOPTHY: CPT | Performed by: INTERNAL MEDICINE

## 2021-04-05 PROCEDURE — 99214 OFFICE O/P EST MOD 30 MIN: CPT | Performed by: INTERNAL MEDICINE

## 2021-04-05 PROCEDURE — G8752 SYS BP LESS 140: HCPCS | Performed by: INTERNAL MEDICINE

## 2021-04-05 PROCEDURE — G8510 SCR DEP NEG, NO PLAN REQD: HCPCS | Performed by: INTERNAL MEDICINE

## 2021-04-05 PROCEDURE — 3017F COLORECTAL CA SCREEN DOC REV: CPT | Performed by: INTERNAL MEDICINE

## 2021-04-05 PROCEDURE — G8427 DOCREV CUR MEDS BY ELIG CLIN: HCPCS | Performed by: INTERNAL MEDICINE

## 2021-04-05 PROCEDURE — G0439 PPPS, SUBSEQ VISIT: HCPCS | Performed by: INTERNAL MEDICINE

## 2021-04-05 PROCEDURE — G8536 NO DOC ELDER MAL SCRN: HCPCS | Performed by: INTERNAL MEDICINE

## 2021-04-05 RX ORDER — GLIPIZIDE 10 MG/1
10 TABLET, FILM COATED, EXTENDED RELEASE ORAL DAILY
Qty: 90 TAB | Refills: 1 | Status: SHIPPED | OUTPATIENT
Start: 2021-04-05 | End: 2021-10-03

## 2021-04-05 RX ORDER — SILDENAFIL 100 MG/1
100 TABLET, FILM COATED ORAL
Qty: 30 TAB | Refills: 5 | Status: SHIPPED | OUTPATIENT
Start: 2021-04-05

## 2021-04-06 NOTE — PROGRESS NOTES
HISTORY OF PRESENT ILLNESS    Chief Complaint   Patient presents with    Medication Evaluation     Need script for viagra.  Diabetes    Hypertension       Presents for follow-up    Diabetes Mellitus follow-up  Last hemoglobin a1c   Lab Results   Component Value Date/Time    Hemoglobin A1c 6.8 (H) 08/12/2020 01:51 PM    Hemoglobin A1c, External 8.2 03/10/2021   labs done at [facility]  Could not afford [facility] May consider getting medications from the [facility] if I can write them. medication compliance: compliant all of the time. Diabetic diet compliance: compliant most of the time. Home glucose monitoring: fasting values range none. Hypoglycemic episodes:  None. Further diabetic ROS: no polyuria or polydipsia, no chest pain, dyspnea or TIA's, no numbness, tingling or pain in extremities. Hypertension  Hypertension ROS: taking medications as instructed, no medication side effects noted, no TIA's, no chest pain on exertion, no dyspnea on exertion, no swelling of ankles     reports that he has been smoking cigars. He has been smoking about 0.25 packs per day. He has never used smokeless tobacco.    reports no history of alcohol use. 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    has a past surgical history that includes hx heent; pr cardiac surg procedure unlist; hx colonoscopy (10/10/2017); hx cataract removal (Left, 2003); hx cataract removal (Right, 2015); and hx svt ablation (06/15/2016). reports that he has been smoking cigars. He has been smoking about 0.25 packs per day. He has never used smokeless tobacco. He reports that he does not drink alcohol.  family history includes Cancer in his father and mother. Physical Exam   Nursing note and vitals reviewed. Blood pressure 107/67, pulse 67, resp. rate 13, height 6' 2\" (1.88 m), weight 250 lb 9.6 oz (113.7 kg), SpO2 97 %. Constitutional:  No distress. Eyes: Conjunctivae are normal.   Ears:  Hearing grossly intact  Cardiovascular: Normal rate. regular rhythm, no murmurs or gallops  No edema  Pulmonary/Chest: Effort normal.   CTAB  Musculoskeletal: moves all 4 extremities   Neurological: Alert and oriented to person, place, and time. Skin: No visible rash noted. Psychiatric: Normal mood and affect. Behavior is normal.     ASSESSMENT and PLAN  Diagnoses and all orders for this visit:    2. Diabetes mellitus with microalbuminuria (HCC)  Uncontrolled. Will increase glipizide to 10 mg. Would ideally benefit from an SGLT2 medication but it seems to be expensive. I am happy to write for prescriptions to be filled at the [facility] if they will cover it. Could also consider Januvia or a GLP-1 med. Continue Metformin as well.    -     glipiZIDE SR (GLUCOTROL XL) 10 mg CR tablet; Take 1 Tab by mouth daily. 3. 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 Secondary to agent orange and diabetes. 4. Diabetic polyneuropathy associated with diabetes mellitus due to underlying condition ([facility]    5. SVT (supraventricular tachycardia) (HCC)  Remains asymptomatic. He is following up with cardiology Dr. Brianna Pendleton.    6. Erectile dysfunction, unspecified erectile dysfunction type  Reasonably controlled with Viagra. Refilled. -     sildenafil citrate (VIAGRA) 100 mg tablet; Take 1 Tab by mouth daily as needed for Erectile Dysfunction.     7.  HTN  This condition is controlled on current medication regimen as written in medication list.  Medications refilled. lab results and schedule of future lab studies reviewed with patient  reviewed medications and side effects in detail    Return to clinic for further evaluation if new symptoms develop    Follow-up and Dispositions    · Return in about 3 months (around 7/5/2021) for Offiice visit and Labs, A1c finger stick test (non-fasting). Current Outpatient Medications   Medication Sig    sildenafil citrate (VIAGRA) 100 mg tablet Take 1 Tab by mouth daily as needed for Erectile Dysfunction.  glipiZIDE SR (GLUCOTROL XL) 10 mg CR tablet Take 1 Tab by mouth daily.  simvastatin (ZOCOR) 20 mg tablet TAKE 1 TABLET BY MOUTH EVERY DAY IN THE EVENING    metFORMIN (GLUCOPHAGE) 1,000 mg tablet TAKE 1 TABLET BY MOUTH TWICE A DAY    lisinopriL (PRINIVIL, ZESTRIL) 20 mg tablet TAKE 1 TABLET BY MOUTH EVERY DAY    sildenafil citrate (Viagra) 100 mg tablet Viagra 100 mg tablet   Take 1 tablet every day by oral route.  ascorbic acid (VITAMIN C PO) Take  by mouth.  Lactobacillus acidophilus (PROBIOTIC PO) Take  by mouth.  FLAXSEED PO Take  by mouth.  tima/linolenic/linoleic/oleic (tima seed oil-omega 3-6-9) 1,000 mg (580 mg) cap Take  by mouth.  cyanocobalamin 1,000 mcg tablet Take 1,000 mcg by mouth daily.  cholecalciferol (VITAMIN D3) 1,000 unit cap Take  by mouth daily. No current facility-administered medications for this visit.

## 2021-04-06 NOTE — PROGRESS NOTES
This is a Subsequent Medicare Annual Wellness Visit providing Personalized Prevention Plan Services (PPPS) (Performed 12 months after initial AWV and PPPS )    I have reviewed the patient's medical history in detail and updated the computerized patient record. History     Past Medical History:   Diagnosis Date    Arthritis     Benign essential HTN     [facility]     DM type 2 with diabetic peripheral neuropathy ([facility]     ED (erectile dysfunction)     Hyponatremia     Na 128 7/2019, Na 131 5/7/20    Imbalance     due to neuropathy of legs, feet.  has cane since 2020 from [address] [place] [address] fiber neuropathy 10/2020    Dr. Ambreen Grace. large fiber sensorimotor polyneuropathy. dx EMG.  Polyneuropathy     Dr. Ruma Puri. hands, feet, balance. 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 José Miguel Neves.  s/p ablation SVT/AVNRT 7/99/85    Umbilical hernia        Past Surgical History:   Procedure Laterality Date    HX CATARACT REMOVAL Left 2003    HX CATARACT REMOVAL Right 2015    HX COLONOSCOPY  10/10/2017    2 5mm polyps. .  repeat 5 years  Dr. Chance Brar HX SVT ABLATION  06/15/2016    Dr. José Miguel Neves.  NV CARDIAC SURG PROCEDURE UNLIST         Current Outpatient Medications   Medication Sig    sildenafil citrate (VIAGRA) 100 mg tablet Take 1 Tab by mouth daily as needed for Erectile Dysfunction.  glipiZIDE SR (GLUCOTROL XL) 10 mg CR tablet Take 1 Tab by mouth daily.  simvastatin (ZOCOR) 20 mg tablet TAKE 1 TABLET BY MOUTH EVERY DAY IN THE EVENING    metFORMIN (GLUCOPHAGE) 1,000 mg tablet TAKE 1 TABLET BY MOUTH TWICE A DAY    lisinopriL (PRINIVIL, ZESTRIL) 20 mg tablet TAKE 1 TABLET BY MOUTH EVERY DAY    sildenafil citrate (Viagra) 100 mg tablet Viagra 100 mg tablet   Take 1 tablet every day by oral route.     ascorbic acid (VITAMIN C PO) Take  by mouth.    Lactobacillus acidophilus (PROBIOTIC PO) Take  by mouth.  FLAXSEED PO Take  by mouth.  tima/linolenic/linoleic/oleic (tima seed oil-omega 3-6-9) 1,000 mg (580 mg) cap Take  by mouth.  cyanocobalamin 1,000 mcg tablet Take 1,000 mcg by mouth daily.  cholecalciferol (VITAMIN D3) 1,000 unit cap Take  by mouth daily. No current facility-administered medications for this visit. Allergies   Allergen Reactions    Pollen Extracts Runny Nose       Family History   Problem Relation Age of Onset    Cancer Mother     Cancer Father         reports that he has been smoking cigars. He has been smoking about 0.25 packs per day. He has never used smokeless tobacco.   reports no history of alcohol use. Depression Risk Factor Screening:       Alcohol Risk Factor Screening: On any occasion during the past 3 months, have you had more than 3 drinks containing alcohol? No    Do you average more than 14 drinks per week? No      Functional Ability and Level of Safety:     Hearing Loss   mild    Activities of Daily Living   Self-care. Requires assistance with: no ADLs    Fall Risk     Fall Risk Assessment, last 12 mths 10/27/2020   Able to walk? Yes   Fall in past 12 months? Yes   Number of falls in past 12 months 2   Fall with injury? 0         Abuse Screen   Patient is not abused    Review of Systems   A comprehensive review of systems was negative except for that written in the HPI. Physical Examination     Evaluation of Cognitive Function:  Mood/affect:  neutral, happy  Appearance: age appropriate  Family member/caregiver input: none    Blood pressure 107/67, pulse 67, resp. rate 13, height 6' 2\" (1.88 m), weight 250 lb 9.6 oz (113.7 kg), SpO2 97 %.   General appearance: alert, cooperative, no distress, appears stated age  Neck: supple, symmetrical, trachea midline, no adenopathy, thyroid: not enlarged, symmetric, no tenderness/mass/nodules, no carotid bruit and no JVD  Lungs: clear to auscultation bilaterally  Heart: regular rate and rhythm, S1, S2 normal, no murmur, click, rub or gallop  Extremities: extremities normal, atraumatic, no cyanosis or edema    Patient Care Team:  Aviva Carranza MD as PCP - General (Internal Medicine)  Aviva Carranza MD as PCP - [facility] Empaneled Provider      Advice/Referrals/Counseling   Education and counseling provided. See below for specific orders    Assessment/Plan   Diagnoses and all orders for this visit:    1. Medicare annual wellness visit, subsequent  Recommend COVID-19 vaccination and diabetic eye exam.  He is otherwise up-to-date. Potential medication side effects were discussed with the patient; let me know if any occur.   Return for yearly Annual Wellness Visits

## 2021-04-26 ENCOUNTER — OFFICE VISIT (OUTPATIENT)
Dept: NEUROLOGY | Age: 76
End: 2021-04-26
Payer: MEDICARE

## 2021-04-26 VITALS
OXYGEN SATURATION: 98 % | DIASTOLIC BLOOD PRESSURE: 70 MMHG | SYSTOLIC BLOOD PRESSURE: 122 MMHG | BODY MASS INDEX: 32.08 KG/M2 | HEART RATE: 82 BPM | HEIGHT: 74 IN | RESPIRATION RATE: 18 BRPM | WEIGHT: 250 LBS

## 2021-04-26 DIAGNOSIS — G62.2 POLYNEUROPATHY DUE TO OTHER TOXIC AGENTS (HCC): ICD-10-CM

## 2021-04-26 DIAGNOSIS — E08.42 DIABETIC POLYNEUROPATHY ASSOCIATED WITH DIABETES MELLITUS DUE TO UNDERLYING CONDITION (HCC): Primary | ICD-10-CM

## 2021-04-26 PROCEDURE — 3052F HG A1C>EQUAL 8.0%<EQUAL 9.0%: CPT | Performed by: PSYCHIATRY & NEUROLOGY

## 2021-04-26 PROCEDURE — 99214 OFFICE O/P EST MOD 30 MIN: CPT | Performed by: PSYCHIATRY & NEUROLOGY

## 2021-04-26 RX ORDER — DOXYCYCLINE 100 MG/1
CAPSULE ORAL 2 TIMES DAILY
COMMUNITY
Start: 2021-04-23 | End: 2022-04-07 | Stop reason: ALTCHOICE

## 2021-04-26 NOTE — LETTER
4/26/2021 Patient: Bart Calderon YOB: 1945 Date of Visit: 4/26/2021 Vaibhav Herman MD 
[address] [facility] [address] [facility] [phone] [address] [employer] Sebastian Garnett MD 
[address] [phone] [address] Fax: [phone] Dear MD Sebastian Duran MD, Thank you for referring Mr. Trini Morton to [address] [place] [address] for evaluation. My notes for this consultation are attached. If you have questions, please do not hesitate to call me. I look forward to following your patient along with you. Sincerely, [facility],  Chief Complaint Patient presents with  Follow-up  
  polyneuropathy HPI Russel Teran is a 68-year-old gentleman following up. I follow him for neuropathy in the setting of diabetes, agent orange exposure, history of alcoholism. Since I saw him last the numbness is getting worse. He has a use a cane now to help with walking especially if there are no bright lights. He can fall easily but fortunately has not fallen. We completed an EMG which showed severe axonal neuropathy. He is doing his best to be very diligent about his diabetes. Fortunately the neuropathy is not painful. He sees the [place] and a [facility] doctor. He has mild symptoms in his fingertips. Background: 
Mr. Tomasa Yarbrough is a 68-year-old gentleman, retired  in [address] [place] [address], here for neuropathy. He tells me that he has had diabetes for almost 30 years close to 20 years. He has no feeling in his feet and ankles also his hands. He is taken a few falls because he feels imbalanced. He also thinks his memory is not so good because he forgets people's names. He has no radicular pain. He takes oral medication to control his diabetes and he tells me last A1c was very stable. He also has evidence of neuropathy possibly contributed to by agent orange based on his VA compensation. Review of Systems Musculoskeletal: Negative for falls. Neurological: Positive for tingling and sensory change. All other systems reviewed and are negative. Past Medical History:  
Diagnosis Date  Arthritis  Benign essential HTN   
 Diabetes mellitus with microalbuminuria ([facility]  DM type 2 with diabetic peripheral neuropathy ([facility]  ED (erectile dysfunction)  Hyponatremia Na 128 7/2019, Na 131 5/7/20  Imbalance   
 due to neuropathy of legs, feet.  has cane since 2020 from [facility]  Large fiber neuropathy 10/2020 Dr. Lenora Abebe. large fiber sensorimotor polyneuropathy. dx EMG.  Polyneuropathy Dr. Deniz Lawton. hands, feet, balance. 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 Javier Tripathi.  s/p ablation SVT/AVNRT 6/15/16  Umbilical hernia Family History Problem Relation Age of Onset  Cancer Mother  Cancer Father Social History Socioeconomic History  Marital status:  Spouse name: Manuel Bachelor  Number of children: 2  
 Years of education: Not on file  Highest education level: Not on file Occupational History  Occupation: Retired  [id number] [employer] Social Needs  Financial resource strain: Not on file  Food insecurity Worry: Not on file Inability: Not on file  Transportation needs Medical: Not on file Non-medical: Not on file Tobacco Use  Smoking status: Current Every Day Smoker Packs/day: 0.25 Types: Cigars  Smokeless tobacco: Never Used Substance and Sexual Activity  Alcohol use: Never Frequency: Never  Drug use: Not on file  Sexual activity: Not on file Lifestyle  Physical activity Days per week: Not on file Minutes per session: Not on file  Stress: Not on file Relationships  Social connections Talks on phone: Not on file   Gets together: Not on file Attends Faith service: Not on file Active member of club or organization: Not on file Attends meetings of clubs or organizations: Not on file Relationship status: Not on file  Intimate partner violence Fear of current or ex partner: Not on file Emotionally abused: Not on file Physically abused: Not on file Forced sexual activity: Not on file Other Topics Concern  Not on file Social History Narrative Son age 40 w no children,   
 elena in [facility], age 36, 1 grandkids Allergies Allergen Reactions  Pollen Extracts Runny Nose Current Outpatient Medications Medication Sig  
 doxycycline (MONODOX) 100 mg capsule two (2) times a day.  sildenafil citrate (VIAGRA) 100 mg tablet Take 1 Tab by mouth daily as needed for Erectile Dysfunction.  glipiZIDE SR (GLUCOTROL XL) 10 mg CR tablet Take 1 Tab by mouth daily.  simvastatin (ZOCOR) 20 mg tablet TAKE 1 TABLET BY MOUTH EVERY DAY IN THE EVENING  
 metFORMIN (GLUCOPHAGE) 1,000 mg tablet TAKE 1 TABLET BY MOUTH TWICE A DAY  lisinopriL (PRINIVIL, ZESTRIL) 20 mg tablet TAKE 1 TABLET BY MOUTH EVERY DAY (Patient taking differently: 10 mg.)  sildenafil citrate (Viagra) 100 mg tablet Viagra 100 mg tablet Take 1 tablet every day by oral route.  ascorbic acid (VITAMIN C PO) Take  by mouth.  Lactobacillus acidophilus (PROBIOTIC PO) Take  by mouth.  FLAXSEED PO Take  by mouth.  tima/linolenic/linoleic/oleic (tima seed oil-omega 3-6-9) 1,000 mg (580 mg) cap Take  by mouth.  cyanocobalamin 1,000 mcg tablet Take 1,000 mcg by mouth daily.  cholecalciferol (VITAMIN D3) 1,000 unit cap Take  by mouth daily.  polyvinyl alcohol-povidon,PF, (REFRESH CLASSIC) 1.4-0.6 % ophthalmic solution INSTILL ONE DROP IN BOTH EYES TWICE A DAY TO TREAT DRY EYES. TWIST AND PULL TOP  TO REMOVE. SINGLE USE CONTAINER. No current facility-administered medications for this visit. Neurologic Exam  
 
Mental Status WD/WN adult in NAD, normal grooming VSS 
A&O x 3 PERRL, nonicteric Face is masked Speech is fluent and clear No limb ataxia. No abnl movements. Absent DTRs lower extremities Moving all extemities spontaneously and symmetric A little wide gait but steady Visit Vitals /70 Pulse 82 Resp 18 Ht 6' 2\" (1.88 m) Wt 250 lb (113.4 kg) SpO2 98% BMI 32.10 kg/m² Assessment and Plan Diagnoses and all orders for this visit: 1. Diabetic polyneuropathy associated with diabetes mellitus due to underlying condition ([facility] 2. 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 Exam is consistent with neuropathy with absent reflexes and some loss of muscle bulk in the legs. EMG also indicative of severe neuropathy. I do not think the symptoms are going to get better and is possible they could get worse over time. He needs to be very strict on his diabetes and continue being physically active is much as possible. Use a cane when needed. I would like to see him annually. He is not having any pain so defer any medications at this time. I reviewed and decided to continue the current medications. This clinical note was dictated with an electronic dictation software that can make unintentional errors. If there are any questions, please contact me directly for clarification.

## 2021-04-26 NOTE — PATIENT INSTRUCTIONS
[facility]   Statement to Patients  April 1, 2014      In an effort to ensure the large volume of patient prescription refills is processed in the most efficient and expeditious manner, we are asking our patients to assist us by calling your Pharmacy for all prescription refills, this will include also your  Mail Order Pharmacy. The pharmacy will contact our office electronically to continue the refill process. Please do not wait until the last minute to call your pharmacy. We need at least 48 hours (2days) to fill prescriptions. We also encourage you to call your pharmacy before going to  your prescription to make sure it is ready. With regard to controlled substance prescription refill requests (narcotic refills) that need to be picked up at our office, we ask your cooperation by providing us with at least 72 hours (3days) notice that you will need a refill. We will not refill narcotic prescription refill requests after 4:00pm on any weekday, Monday through Thursday, or after 2:00pm on Fridays, or on the weekends. We encourage everyone to explore another way of getting your prescription refill request processed using [employer], our patient web portal through our electronic medical record system. [employer] is an efficient and effective way to communicate your medication request directly to the office and  downloadable as an ladonna on your smart phone . [employer] also features a review functionality that allows you to view your medication list as well as leave messages for your physician. Are you ready to get connected? If so please review the attatched instructions or speak to any of our staff to get you set up right away! Thank you so much for your cooperation. Should you have any questions please contact our Practice Administrator.     The Physicians and Staff,  [facility]

## 2021-04-26 NOTE — PROGRESS NOTES
Mr. Kaden Carrasco presents today to follow up polyneuropathy. His symptoms have worsened. Depression screening done on this patient.

## 2021-04-26 NOTE — PROGRESS NOTES
Chief Complaint   Patient presents with    Follow-up     polyneuropathy       HPI        Jessika Duffy is a 72-year-old gentleman following up. I follow him for neuropathy in the setting of diabetes, agent orange exposure, history of alcoholism. Since I saw him last the numbness is getting worse. He has a use a cane now to help with walking especially if there are no bright lights. He can fall easily but fortunately has not fallen. We completed an EMG which showed severe axonal neuropathy. He is doing his best to be very diligent about his diabetes. Fortunately the neuropathy is not painful. He sees the [place] and a [facility] doctor. He has mild symptoms in his fingertips. Background:  Mr. Riley Frias is a 72-year-old gentleman, retired  in [address] [place] [address], here for neuropathy. He tells me that he has had diabetes for almost 30 years close to 20 years. He has no feeling in his feet and ankles also his hands. He is taken a few falls because he feels imbalanced. He also thinks his memory is not so good because he forgets people's names. He has no radicular pain. He takes oral medication to control his diabetes and he tells me last A1c was very stable. He also has evidence of neuropathy possibly contributed to by agent orange based on his VA compensation. Review of Systems   Musculoskeletal: Negative for falls. Neurological: Positive for tingling and sensory change. All other systems reviewed and are negative. Past Medical History:   Diagnosis Date    Arthritis     Benign essential HTN     [facility] ([facility]     DM type 2 with diabetic peripheral neuropathy ([facility]     ED (erectile dysfunction)     Hyponatremia     Na 128 7/2019, Na 131 5/7/20    Imbalance     due to neuropathy of legs, feet.  has cane since 2020 from [address] [place] [address] fiber neuropathy 10/2020    Dr. Rose Us. large fiber sensorimotor polyneuropathy. dx EMG.       Polyneuropathy Dr. Mirta Pardo. hands, feet, balance. 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 Luis Jimenez.  s/p ablation SVT/AVNRT 7/17/39    Umbilical hernia      Family History   Problem Relation Age of Onset    Cancer Mother     Cancer Father      Social History     Socioeconomic History    Marital status:      Spouse name: Georgi Foy Number of children: 2    Years of education: Not on file    Highest education level: Not on file   Occupational History    Occupation: Retired  [employer].   [employer] Needs    Financial resource strain: Not on file    Food insecurity     Worry: Not on file     Inability: Not on file   [employer] needs     Medical: Not on file     Non-medical: Not on file   Tobacco Use    Smoking status: Current Every Day Smoker     Packs/day: 0.25     Types: Cigars    Smokeless tobacco: Never Used   Substance and Sexual Activity    Alcohol use: Never     Frequency: Never    Drug use: Not on file    Sexual activity: Not on file   Lifestyle    Physical activity     Days per week: Not on file     Minutes per session: Not on file    Stress: Not on file   Relationships    Social connections     Talks on phone: Not on file     Gets together: Not on file     Attends Evangelical service: Not on file     Active member of club or organization: Not on file     Attends meetings of clubs or organizations: Not on file     Relationship status: Not on file    Intimate partner violence     Fear of current or ex partner: Not on file     Emotionally abused: Not on file     Physically abused: Not on file     Forced sexual activity: Not on file   Other Topics Concern    Not on file   Social History Narrative    Son age 40 w no children,     elena in [place], age 36, 3 grandkids     Allergies   Allergen Reactions    Pollen Extracts Runny Nose         Current Outpatient Medications Medication Sig    doxycycline (MONODOX) 100 mg capsule two (2) times a day.  sildenafil citrate (VIAGRA) 100 mg tablet Take 1 Tab by mouth daily as needed for Erectile Dysfunction.  glipiZIDE SR (GLUCOTROL XL) 10 mg CR tablet Take 1 Tab by mouth daily.  simvastatin (ZOCOR) 20 mg tablet TAKE 1 TABLET BY MOUTH EVERY DAY IN THE EVENING    metFORMIN (GLUCOPHAGE) 1,000 mg tablet TAKE 1 TABLET BY MOUTH TWICE A DAY    lisinopriL (PRINIVIL, ZESTRIL) 20 mg tablet TAKE 1 TABLET BY MOUTH EVERY DAY (Patient taking differently: 10 mg.)    sildenafil citrate (Viagra) 100 mg tablet Viagra 100 mg tablet   Take 1 tablet every day by oral route.  ascorbic acid (VITAMIN C PO) Take  by mouth.  Lactobacillus acidophilus (PROBIOTIC PO) Take  by mouth.  FLAXSEED PO Take  by mouth.  tima/linolenic/linoleic/oleic (tima seed oil-omega 3-6-9) 1,000 mg (580 mg) cap Take  by mouth.  cyanocobalamin 1,000 mcg tablet Take 1,000 mcg by mouth daily.  cholecalciferol (VITAMIN D3) 1,000 unit cap Take  by mouth daily.  polyvinyl alcohol-povidon,PF, (REFRESH CLASSIC) 1.4-0.6 % ophthalmic solution INSTILL ONE DROP IN BOTH EYES TWICE A DAY TO TREAT DRY EYES. TWIST AND PULL TOP  TO REMOVE. SINGLE USE CONTAINER. No current facility-administered medications for this visit. Neurologic Exam     Mental Status   WD/WN adult in NAD, normal grooming  VSS  A&O x 3    PERRL, nonicteric  Face is masked  Speech is fluent and clear  No limb ataxia. No abnl movements. Absent DTRs lower extremities  Moving all extemities spontaneously and symmetric  A little wide gait but steady             Visit Vitals  /70   Pulse 82   Resp 18   Ht 6' 2\" (1.88 m)   Wt 250 lb (113.4 kg)   SpO2 98%   BMI 32.10 kg/m²       Assessment and Plan   Diagnoses and all orders for this visit:    1. Diabetic polyneuropathy associated with diabetes mellitus due to underlying condition ([facility] [address]    2.  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 Exam is consistent with neuropathy with absent reflexes and some loss of muscle bulk in the legs. EMG also indicative of severe neuropathy. I do not think the symptoms are going to get better and is possible they could get worse over time. He needs to be very strict on his diabetes and continue being physically active is much as possible. Use a cane when needed. I would like to see him annually. He is not having any pain so defer any medications at this time. 30 minutes of time was spent reviewing the medical record today to include the EMG results, face-to-face time with examination and completion of documentation. I reviewed and decided to continue the current medications. This clinical note was dictated with an electronic dictation software that can make unintentional errors. If there are any questions, please contact me directly for clarification.       [address] [facility], [facility] [address]  Diplomate JENNIFER

## 2021-07-07 ENCOUNTER — OFFICE VISIT (OUTPATIENT)
Dept: INTERNAL MEDICINE CLINIC | Age: 76
End: 2021-07-07
Payer: MEDICARE

## 2021-07-07 VITALS
HEART RATE: 60 BPM | BODY MASS INDEX: 31.95 KG/M2 | TEMPERATURE: 97.3 F | WEIGHT: 249 LBS | RESPIRATION RATE: 14 BRPM | SYSTOLIC BLOOD PRESSURE: 128 MMHG | OXYGEN SATURATION: 98 % | DIASTOLIC BLOOD PRESSURE: 76 MMHG | HEIGHT: 74 IN

## 2021-07-07 DIAGNOSIS — E11.42 DM TYPE 2 WITH DIABETIC PERIPHERAL NEUROPATHY (HCC): Primary | ICD-10-CM

## 2021-07-07 DIAGNOSIS — I10 BENIGN ESSENTIAL HTN: ICD-10-CM

## 2021-07-07 DIAGNOSIS — Z87.828 HISTORY OF TRAUMA: ICD-10-CM

## 2021-07-07 LAB — HBA1C MFR BLD HPLC: 6.9 %

## 2021-07-07 PROCEDURE — G8752 SYS BP LESS 140: HCPCS | Performed by: INTERNAL MEDICINE

## 2021-07-07 PROCEDURE — 83036 HEMOGLOBIN GLYCOSYLATED A1C: CPT | Performed by: INTERNAL MEDICINE

## 2021-07-07 PROCEDURE — G8536 NO DOC ELDER MAL SCRN: HCPCS | Performed by: INTERNAL MEDICINE

## 2021-07-07 PROCEDURE — 3044F HG A1C LEVEL LT 7.0%: CPT | Performed by: INTERNAL MEDICINE

## 2021-07-07 PROCEDURE — 1100F PTFALLS ASSESS-DOCD GE2>/YR: CPT | Performed by: INTERNAL MEDICINE

## 2021-07-07 PROCEDURE — 3288F FALL RISK ASSESSMENT DOCD: CPT | Performed by: INTERNAL MEDICINE

## 2021-07-07 PROCEDURE — 2022F DILAT RTA XM EVC RTNOPTHY: CPT | Performed by: INTERNAL MEDICINE

## 2021-07-07 PROCEDURE — G8417 CALC BMI ABV UP PARAM F/U: HCPCS | Performed by: INTERNAL MEDICINE

## 2021-07-07 PROCEDURE — G8754 DIAS BP LESS 90: HCPCS | Performed by: INTERNAL MEDICINE

## 2021-07-07 PROCEDURE — 3017F COLORECTAL CA SCREEN DOC REV: CPT | Performed by: INTERNAL MEDICINE

## 2021-07-07 PROCEDURE — 99213 OFFICE O/P EST LOW 20 MIN: CPT | Performed by: INTERNAL MEDICINE

## 2021-07-07 PROCEDURE — G8432 DEP SCR NOT DOC, RNG: HCPCS | Performed by: INTERNAL MEDICINE

## 2021-07-07 PROCEDURE — G0463 HOSPITAL OUTPT CLINIC VISIT: HCPCS | Performed by: INTERNAL MEDICINE

## 2021-07-07 PROCEDURE — G8427 DOCREV CUR MEDS BY ELIG CLIN: HCPCS | Performed by: INTERNAL MEDICINE

## 2021-07-07 RX ORDER — INSULIN PUMP SYRINGE, 3 ML
EACH MISCELLANEOUS
Qty: 1 KIT | Refills: 0 | Status: SHIPPED | OUTPATIENT
Start: 2021-07-07

## 2021-07-07 RX ORDER — LANCETS
EACH MISCELLANEOUS
Qty: 50 EACH | Refills: 5 | Status: SHIPPED | OUTPATIENT
Start: 2021-07-07

## 2021-07-07 NOTE — PROGRESS NOTES
HISTORY OF PRESENT ILLNESS    Chief Complaint   Patient presents with    Follow-up     He is concered about a spot on his left arm he drives with his arm out the window - and states that his memory is fuzzy        Presents for follow-up  Lost 3 friends lately. One due to [place]. New DM shoes from [facility]. Getting inserts. Neuropathy remains challeging    Admits to some depression/ PTSD s/s due to [place].  Has not spoken with to anyone other than his wife. Seeing derm yearly. Has left lateral elbow lesion for 2 months. Scaling. Diabetes Mellitus follow-up  Last hemoglobin a1c   Lab Results   Component Value Date/Time    Hemoglobin A1c 6.8 (H) 08/12/2020 01:51 PM    Hemoglobin A1c (POC) 6.9 07/07/2021 09:55 AM    Hemoglobin A1c, External 8.2 03/10/2021 12:00 AM   medication compliance: compliant all of the time. Increased glipizide to 10 mg and is exercising  Diabetic diet compliance: compliant most of the time. Home glucose monitoring: fasting values range METER LOST. Hypoglycemic episodes:  None. Further diabetic ROS: no polyuria or polydipsia, no chest pain, dyspnea or TIA's, no numbness, tingling or pain in extremities. Hypertension  Hypertension ROS: taking medications as instructed, no medication side effects noted, no TIA's, no chest pain on exertion, no dyspnea on exertion, no swelling of ankles     reports that he has been smoking cigars. He has been smoking about 0.25 packs per day. He has never used smokeless tobacco.    reports no history of alcohol use.    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 has a past surgical history that includes hx heent; pr cardiac surg procedure unlist; hx colonoscopy (10/10/2017); hx cataract removal (Left, 2003); hx cataract removal (Right, 2015); and hx svt ablation (06/15/2016). reports that he has been smoking cigars. He has been smoking about 0.25 packs per day. He has never used smokeless tobacco. He reports that he does not drink alcohol.  family history includes Cancer in his father and mother. Physical Exam   Nursing note and vitals reviewed. Blood pressure 128/76, pulse 60, temperature 97.3 °F (36.3 °C), temperature source Temporal, resp. rate 14, height 6' 2\" (1.88 m), weight 249 lb (112.9 kg), SpO2 98 %. Constitutional:  No distress. Eyes: Conjunctivae are normal.   Ears:  Hearing grossly intact  Cardiovascular: Normal rate. regular rhythm, no murmurs or gallops  No edema  Pulmonary/Chest: Effort normal.   CTAB  Musculoskeletal: moves all 4 extremities   Neurological: Alert and oriented to person, place, and time. Skin: No visible rash noted. Psychiatric: Normal mood and affect. Behavior is normal.     ASSESSMENT and PLAN  Diagnoses and all orders for this visit:    1. DM type 2 with diabetic peripheral neuropathy [facility])  He is very happy that his A1c has been reduced. He will continue higher dose glipizide and Metformin. Repeat labs in 3 months. New meter given. -     AMB POC HEMOGLOBIN A1C  -     Blood-Glucose Meter monitoring kit; 1 meter. E11.29  -     glucose blood VI test strips (ASCENSIA AUTODISC VI, ONE TOUCH ULTRA TEST VI) strip; Check once daily. E11.29  -     lancets misc; Use once daily    2. Benign essential HTN   This condition is controlled on current medication regimen as written in medication list.  Medications refilled.     3. History of trauma        There are no Patient Instructions on file for this visit.    lab results and schedule of future lab studies reviewed with patient  reviewed medications and side effects in detail    Return to clinic for further evaluation if new symptoms develop        Current Outpatient Medications   Medication Sig    doxycycline (MONODOX) 100 mg capsule two (2) times a day.  polyvinyl alcohol-povidon,PF, (REFRESH CLASSIC) 1.4-0.6 % ophthalmic solution INSTILL ONE DROP IN BOTH EYES TWICE A DAY TO TREAT DRY EYES. TWIST AND PULL TOP  TO REMOVE. SINGLE USE CONTAINER.  sildenafil citrate (VIAGRA) 100 mg tablet Take 1 Tab by mouth daily as needed for Erectile Dysfunction.  glipiZIDE SR (GLUCOTROL XL) 10 mg CR tablet Take 1 Tab by mouth daily.  simvastatin (ZOCOR) 20 mg tablet TAKE 1 TABLET BY MOUTH EVERY DAY IN THE EVENING    metFORMIN (GLUCOPHAGE) 1,000 mg tablet TAKE 1 TABLET BY MOUTH TWICE A DAY    lisinopriL (PRINIVIL, ZESTRIL) 20 mg tablet TAKE 1 TABLET BY MOUTH EVERY DAY (Patient taking differently: 10 mg.)    sildenafil citrate (Viagra) 100 mg tablet Viagra 100 mg tablet   Take 1 tablet every day by oral route.  ascorbic acid (VITAMIN C PO) Take  by mouth.  Lactobacillus acidophilus (PROBIOTIC PO) Take  by mouth.  FLAXSEED PO Take  by mouth.  cyanocobalamin 1,000 mcg tablet Take 1,000 mcg by mouth daily.  cholecalciferol (VITAMIN D3) 1,000 unit cap Take  by mouth daily. No current facility-administered medications for this visit.

## 2021-08-14 DIAGNOSIS — E78.5 HYPERLIPIDEMIA, UNSPECIFIED: ICD-10-CM

## 2021-08-14 RX ORDER — METFORMIN HYDROCHLORIDE 1000 MG/1
TABLET ORAL
Qty: 180 TABLET | Refills: 1 | Status: SHIPPED | OUTPATIENT
Start: 2021-08-14 | End: 2022-01-04

## 2021-08-14 RX ORDER — SIMVASTATIN 20 MG/1
TABLET, FILM COATED ORAL
Qty: 90 TABLET | Refills: 1 | Status: SHIPPED | OUTPATIENT
Start: 2021-08-14 | End: 2022-01-04

## 2021-10-03 DIAGNOSIS — E11.29 DIABETES MELLITUS WITH MICROALBUMINURIA (HCC): ICD-10-CM

## 2021-10-03 DIAGNOSIS — R80.9 DIABETES MELLITUS WITH MICROALBUMINURIA (HCC): ICD-10-CM

## 2021-10-03 RX ORDER — GLIPIZIDE 10 MG/1
TABLET, FILM COATED, EXTENDED RELEASE ORAL
Qty: 90 TABLET | Refills: 1 | Status: SHIPPED | OUTPATIENT
Start: 2021-10-03 | End: 2022-02-16

## 2021-10-07 ENCOUNTER — OFFICE VISIT (OUTPATIENT)
Dept: INTERNAL MEDICINE CLINIC | Age: 76
End: 2021-10-07
Payer: MEDICARE

## 2021-10-07 VITALS
DIASTOLIC BLOOD PRESSURE: 64 MMHG | SYSTOLIC BLOOD PRESSURE: 107 MMHG | BODY MASS INDEX: 32.26 KG/M2 | HEIGHT: 74 IN | TEMPERATURE: 98.3 F | WEIGHT: 251.4 LBS | OXYGEN SATURATION: 97 % | HEART RATE: 65 BPM | RESPIRATION RATE: 13 BRPM

## 2021-10-07 DIAGNOSIS — I10 BENIGN ESSENTIAL HTN: ICD-10-CM

## 2021-10-07 DIAGNOSIS — G62.9 POLYNEUROPATHY: ICD-10-CM

## 2021-10-07 DIAGNOSIS — E11.42 DM TYPE 2 WITH DIABETIC PERIPHERAL NEUROPATHY (HCC): Primary | ICD-10-CM

## 2021-10-07 DIAGNOSIS — Z23 NEEDS FLU SHOT: ICD-10-CM

## 2021-10-07 LAB — HBA1C MFR BLD HPLC: 6.7 %

## 2021-10-07 PROCEDURE — G8536 NO DOC ELDER MAL SCRN: HCPCS | Performed by: INTERNAL MEDICINE

## 2021-10-07 PROCEDURE — G8427 DOCREV CUR MEDS BY ELIG CLIN: HCPCS | Performed by: INTERNAL MEDICINE

## 2021-10-07 PROCEDURE — G8752 SYS BP LESS 140: HCPCS | Performed by: INTERNAL MEDICINE

## 2021-10-07 PROCEDURE — 1100F PTFALLS ASSESS-DOCD GE2>/YR: CPT | Performed by: INTERNAL MEDICINE

## 2021-10-07 PROCEDURE — 3288F FALL RISK ASSESSMENT DOCD: CPT | Performed by: INTERNAL MEDICINE

## 2021-10-07 PROCEDURE — G8510 SCR DEP NEG, NO PLAN REQD: HCPCS | Performed by: INTERNAL MEDICINE

## 2021-10-07 PROCEDURE — 83036 HEMOGLOBIN GLYCOSYLATED A1C: CPT | Performed by: INTERNAL MEDICINE

## 2021-10-07 PROCEDURE — 2022F DILAT RTA XM EVC RTNOPTHY: CPT | Performed by: INTERNAL MEDICINE

## 2021-10-07 PROCEDURE — 99214 OFFICE O/P EST MOD 30 MIN: CPT | Performed by: INTERNAL MEDICINE

## 2021-10-07 PROCEDURE — G8754 DIAS BP LESS 90: HCPCS | Performed by: INTERNAL MEDICINE

## 2021-10-07 PROCEDURE — 90694 VACC AIIV4 NO PRSRV 0.5ML IM: CPT | Performed by: INTERNAL MEDICINE

## 2021-10-07 PROCEDURE — G8417 CALC BMI ABV UP PARAM F/U: HCPCS | Performed by: INTERNAL MEDICINE

## 2021-10-07 PROCEDURE — 3017F COLORECTAL CA SCREEN DOC REV: CPT | Performed by: INTERNAL MEDICINE

## 2021-10-07 PROCEDURE — 3044F HG A1C LEVEL LT 7.0%: CPT | Performed by: INTERNAL MEDICINE

## 2021-10-07 PROCEDURE — G0463 HOSPITAL OUTPT CLINIC VISIT: HCPCS | Performed by: INTERNAL MEDICINE

## 2021-10-07 RX ORDER — LISINOPRIL 20 MG/1
10 TABLET ORAL DAILY
Qty: 45 TABLET | Refills: 1
Start: 2021-10-07 | End: 2021-12-02

## 2021-10-07 NOTE — PROGRESS NOTES
HISTORY OF PRESENT ILLNESS    Chief Complaint   Patient presents with    Diabetes     3 mo f/u    Immunization/Injection     Flu shot       Presents for follow-up    Has DM shoes from [place]. Neuropathy remains challenging. no pain. Fears he will fall. He has a cane for gait. Does not really want to use more but thinks he might need more. Diabetes Mellitus follow-up  Last hemoglobin a1c   Lab Results   Component Value Date/Time    Hemoglobin A1c 6.8 (H) 08/12/2020 01:51 PM    Hemoglobin A1c (POC) 6.7 10/07/2021 11:02 AM    Hemoglobin A1c, External 8.2 03/10/2021 12:00 AM   medication compliance: compliant all of the time. Taking glipizide increased to 10 mg and is exercising. Taking Metformin 1000 g twice daily as well. Diabetic diet compliance: compliant most of the time. Hypoglycemic episodes:  None. Further diabetic ROS: no polyuria or polydipsia, no chest pain, dyspnea or TIA's, no pain in extremities. Hypertension  Hypertension ROS: taking medications as instructed, no medication side effects noted, no TIA's, no chest pain on exertion, no dyspnea on exertion, no swelling of ankles     reports that he has been smoking cigars. He has been smoking about 0.25 packs per day. He has never used smokeless tobacco.    reports no history of alcohol use. 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    has a past surgical history that includes hx heent; pr cardiac surg procedure unlist; hx colonoscopy (10/10/2017); hx cataract removal (Left, 2003); hx cataract removal (Right, 2015); and hx svt ablation (06/15/2016). reports that he has been smoking cigars. He has been smoking about 0.25 packs per day. He has never used smokeless tobacco. He reports that he does not drink alcohol.  family history includes Cancer in his father and mother. Physical Exam   Nursing note and vitals reviewed. Blood pressure 107/64, pulse 65, temperature 98.3 °F (36.8 °C), temperature source Oral, resp. rate 13, height 6' 2\" (1.88 m), weight 251 lb 6.4 oz (114 kg), SpO2 97 %. Constitutional:  No distress. Eyes: Conjunctivae are normal.   Ears:  Hearing grossly intact  Cardiovascular: Normal rate. regular rhythm, no murmurs or gallops  No edema  Pulmonary/Chest: Effort normal.   CTAB  Musculoskeletal: moves all 4 extremities   Neurological: Alert and oriented to person, place, and time. Skin: No visible rash noted. Psychiatric: Normal mood and affect. Behavior is normal.   Foot exam  - skin intact, mild dryness w no fissures, no rashes, no significant ulcers or callous formation. Sensation decreased by microfilament or light touch      Diagnoses and all orders for this visit:    1. DM type 2 with diabetic peripheral neuropathy ([address]  Controlled and doing well. Consider changing to a SGLT2 or GLP-1 medication. Cost may be an issue.  -     AMB POC HEMOGLOBIN T5U  -     METABOLIC PANEL, COMPREHENSIVE; Future  -     MICROALBUMIN, UR, RAND W/ MICROALB/CREAT RATIO; Future  -     CBC W/O DIFF; Future    2. Polyneuropathy  Significant. Poses some degree of a fall risk. May consider consultation with physical therapy. 3. Benign essential HTN  This condition is controlled on current medication regimen as written in medication list.  Medications refilled.   Could consider decreasing lisinopril to 5 mg.  -     lisinopriL (PRINIVIL, ZESTRIL) 20 mg tablet; Take 0.5 Tablets by mouth daily.  -     CBC W/O DIFF; Future    4. Needs flu shot  -     FLU (FLUAD QUAD INFLUENZA VACCINE,QUAD,ADJUVANTED)        lab results and schedule of future lab studies reviewed with patient  reviewed medications and side effects in detail    Return to clinic for further evaluation if new symptoms develop        Current Outpatient Medications   Medication Sig    glipiZIDE SR (GLUCOTROL XL) 10 mg CR tablet TAKE 1 TABLET BY MOUTH EVERY DAY    metFORMIN (GLUCOPHAGE) 1,000 mg tablet TAKE 1 TABLET BY MOUTH TWICE A DAY    simvastatin (ZOCOR) 20 mg tablet TAKE 1 TABLET BY MOUTH EVERY DAY IN THE EVENING    Blood-Glucose Meter monitoring kit 1 meter. E11.29    glucose blood VI test strips (ASCENSIA AUTODISC VI, ONE TOUCH ULTRA TEST VI) strip Check once daily. E11.29    lancets misc Use once daily    doxycycline (MONODOX) 100 mg capsule two (2) times a day.  polyvinyl alcohol-povidon,PF, (REFRESH CLASSIC) 1.4-0.6 % ophthalmic solution INSTILL ONE DROP IN BOTH EYES TWICE A DAY TO TREAT DRY EYES. TWIST AND PULL TOP  TO REMOVE. SINGLE USE CONTAINER.  sildenafil citrate (VIAGRA) 100 mg tablet Take 1 Tab by mouth daily as needed for Erectile Dysfunction.  lisinopriL (PRINIVIL, ZESTRIL) 20 mg tablet TAKE 1 TABLET BY MOUTH EVERY DAY (Patient taking differently: 10 mg.)    sildenafil citrate (Viagra) 100 mg tablet Viagra 100 mg tablet   Take 1 tablet every day by oral route.  ascorbic acid (VITAMIN C PO) Take  by mouth.  Lactobacillus acidophilus (PROBIOTIC PO) Take  by mouth.  FLAXSEED PO Take  by mouth.  cyanocobalamin 1,000 mcg tablet Take 1,000 mcg by mouth daily.  cholecalciferol (VITAMIN D3) 1,000 unit cap Take  by mouth daily. No current facility-administered medications for this visit.

## 2021-10-07 NOTE — PROGRESS NOTES
Patient present for routine immunizations. Pt denies any symptoms , reactions or allergies that would exclude them from being immunized today. Risks and adverse reactions were discussed and the VIS was given to them. All questions were addressed. Pt was observed for 10 min post injection. There were no reactions observed.       Serenity [address]

## 2021-12-02 DIAGNOSIS — I10 BENIGN ESSENTIAL HTN: ICD-10-CM

## 2021-12-02 RX ORDER — LISINOPRIL 20 MG/1
TABLET ORAL
Qty: 90 TABLET | Refills: 1 | Status: SHIPPED | OUTPATIENT
Start: 2021-12-02 | End: 2022-05-31

## 2022-01-04 DIAGNOSIS — E78.5 HYPERLIPIDEMIA, UNSPECIFIED: ICD-10-CM

## 2022-01-04 RX ORDER — METFORMIN HYDROCHLORIDE 1000 MG/1
TABLET ORAL
Qty: 180 TABLET | Refills: 1 | Status: SHIPPED | OUTPATIENT
Start: 2022-01-04 | End: 2022-08-11

## 2022-01-04 RX ORDER — SIMVASTATIN 20 MG/1
TABLET, FILM COATED ORAL
Qty: 90 TABLET | Refills: 1 | Status: SHIPPED | OUTPATIENT
Start: 2022-01-04 | End: 2022-09-09

## 2022-02-16 DIAGNOSIS — E11.29 DIABETES MELLITUS WITH MICROALBUMINURIA (HCC): ICD-10-CM

## 2022-02-16 DIAGNOSIS — R80.9 DIABETES MELLITUS WITH MICROALBUMINURIA (HCC): ICD-10-CM

## 2022-02-16 RX ORDER — GLIPIZIDE 10 MG/1
TABLET, FILM COATED, EXTENDED RELEASE ORAL
Qty: 90 TABLET | Refills: 1 | Status: SHIPPED | OUTPATIENT
Start: 2022-02-16 | End: 2022-09-09

## 2022-03-19 PROBLEM — E11.40 TYPE 2 DIABETES MELLITUS WITH DIABETIC NEUROPATHY (HCC): Status: ACTIVE | Noted: 2020-06-03

## 2022-03-19 PROBLEM — G62.9 LARGE FIBER NEUROPATHY: Status: ACTIVE | Noted: 2020-10-01

## 2022-04-07 ENCOUNTER — OFFICE VISIT (OUTPATIENT)
Dept: INTERNAL MEDICINE CLINIC | Age: 77
End: 2022-04-07
Payer: MEDICARE

## 2022-04-07 VITALS
RESPIRATION RATE: 15 BRPM | TEMPERATURE: 97.5 F | WEIGHT: 239.8 LBS | OXYGEN SATURATION: 98 % | DIASTOLIC BLOOD PRESSURE: 72 MMHG | SYSTOLIC BLOOD PRESSURE: 132 MMHG | BODY MASS INDEX: 30.77 KG/M2 | HEIGHT: 74 IN | HEART RATE: 65 BPM

## 2022-04-07 DIAGNOSIS — I47.1 SVT (SUPRAVENTRICULAR TACHYCARDIA) (HCC): ICD-10-CM

## 2022-04-07 DIAGNOSIS — G62.9 POLYNEUROPATHY: ICD-10-CM

## 2022-04-07 DIAGNOSIS — Z00.00 MEDICARE ANNUAL WELLNESS VISIT, SUBSEQUENT: Primary | ICD-10-CM

## 2022-04-07 DIAGNOSIS — E11.42 DM TYPE 2 WITH DIABETIC PERIPHERAL NEUROPATHY (HCC): ICD-10-CM

## 2022-04-07 DIAGNOSIS — G62.2 POLYNEUROPATHY DUE TO OTHER TOXIC AGENTS (HCC): ICD-10-CM

## 2022-04-07 DIAGNOSIS — N41.9 PROSTATITIS, UNSPECIFIED PROSTATITIS TYPE: ICD-10-CM

## 2022-04-07 DIAGNOSIS — I10 BENIGN ESSENTIAL HTN: ICD-10-CM

## 2022-04-07 PROCEDURE — G8752 SYS BP LESS 140: HCPCS | Performed by: INTERNAL MEDICINE

## 2022-04-07 PROCEDURE — G8536 NO DOC ELDER MAL SCRN: HCPCS | Performed by: INTERNAL MEDICINE

## 2022-04-07 PROCEDURE — G0463 HOSPITAL OUTPT CLINIC VISIT: HCPCS | Performed by: INTERNAL MEDICINE

## 2022-04-07 PROCEDURE — 2022F DILAT RTA XM EVC RTNOPTHY: CPT | Performed by: INTERNAL MEDICINE

## 2022-04-07 PROCEDURE — 99214 OFFICE O/P EST MOD 30 MIN: CPT | Performed by: INTERNAL MEDICINE

## 2022-04-07 PROCEDURE — G8754 DIAS BP LESS 90: HCPCS | Performed by: INTERNAL MEDICINE

## 2022-04-07 PROCEDURE — 1101F PT FALLS ASSESS-DOCD LE1/YR: CPT | Performed by: INTERNAL MEDICINE

## 2022-04-07 PROCEDURE — 3046F HEMOGLOBIN A1C LEVEL >9.0%: CPT | Performed by: INTERNAL MEDICINE

## 2022-04-07 PROCEDURE — G0439 PPPS, SUBSEQ VISIT: HCPCS | Performed by: INTERNAL MEDICINE

## 2022-04-07 PROCEDURE — 3017F COLORECTAL CA SCREEN DOC REV: CPT | Performed by: INTERNAL MEDICINE

## 2022-04-07 PROCEDURE — G8427 DOCREV CUR MEDS BY ELIG CLIN: HCPCS | Performed by: INTERNAL MEDICINE

## 2022-04-07 PROCEDURE — G8510 SCR DEP NEG, NO PLAN REQD: HCPCS | Performed by: INTERNAL MEDICINE

## 2022-04-07 PROCEDURE — G8417 CALC BMI ABV UP PARAM F/U: HCPCS | Performed by: INTERNAL MEDICINE

## 2022-04-07 NOTE — PROGRESS NOTES
This is a Subsequent Medicare Annual Wellness Visit providing Personalized Prevention Plan Services (PPPS) (Performed 12 months after initial AWV and PPPS )    I have reviewed the patient's medical history in detail and updated the computerized patient record. [facility] but has tremendous distrust  in their system and providers. Progressive neuropathy. Seeing neurology. Large fiber. Secondary to agent orange, diabetes, history of alcoholism. Seeing EP Dr. Angus Medina re: possible need for pacemaker. History of SVT. History of ablation. May also have AICD done. Diabetes Mellitus follow-up  Last hemoglobin a1c   Lab Results   Component Value Date/Time    Hemoglobin A1c 6.8 (H) 08/12/2020 01:51 PM    Hemoglobin A1c (POC) 6.7 10/07/2021 11:02 AM    Hemoglobin A1c, External 8.2 03/10/2021 12:00 AM   medication compliance: compliant all of the time. Diabetic diet compliance: compliant most of the time. Home glucose monitoring: fasting values range 110. Hypoglycemic episodes:  None lately  Further diabetic ROS: no polyuria or polydipsia, no chest pain, dyspnea or TIA's. Requests prostate exam  Lab Results   Component Value Date/Time    PSA, External 0.54 07/10/2019 12:00 AM         History     Past Medical History:   Diagnosis Date    Arthritis     Benign essential HTN     Diabetes mellitus with [facility]albuminuria [facility] [address]     DM type 2 with diabetic peripheral neuropathy ([facility] [facility]     ED (erectile dysfunction)     History of trauma     [facility]    Hyponatremia     Na 128 7/2019, Na 131 5/7/20    Imbalance     due to neuropathy of legs, feet.  has cane since 2020 from [address] [place] [address] fiber neuropathy 10/2020    Dr. Anamaria Mensah. large fiber sensorimotor polyneuropathy. dx EMG.  Polyneuropathy     Dr. Shanda Palacios. hands, feet, balance.    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 [address]     Dr. Ricky Aguilera.  s/p ablation SVT/AVNRT 4/48/98    Umbilical hernia        Past Surgical History:   Procedure Laterality Date    HX CATARACT REMOVAL Left 2003    HX CATARACT REMOVAL Right 2015    HX COLONOSCOPY  10/10/2017    2 5mm polyps. .  repeat 5 years  Dr. Rox Medina HX SVT ABLATION  06/15/2016    Dr. Ricky Aguilera.  OR CARDIAC SURG PROCEDURE UNLIST         Current Outpatient Medications   Medication Sig    glipiZIDE SR (GLUCOTROL XL) 10 mg CR tablet TAKE 1 TABLET BY MOUTH EVERY DAY    metFORMIN (GLUCOPHAGE) 1,000 mg tablet TAKE 1 TABLET BY MOUTH TWICE A DAY    simvastatin (ZOCOR) 20 mg tablet TAKE 1 TABLET BY MOUTH EVERY DAY IN THE EVENING    lisinopriL (PRINIVIL, ZESTRIL) 20 mg tablet TAKE 1 TABLET BY MOUTH EVERY DAY    Blood-Glucose Meter monitoring kit 1 meter. E11.29    glucose blood VI test strips (ASCENSIA AUTODISC VI, ONE TOUCH ULTRA TEST VI) strip Check once daily. E11.29    lancets misc Use once daily    polyvinyl alcohol-povidon,PF, (REFRESH CLASSIC) 1.4-0.6 % ophthalmic solution INSTILL ONE DROP IN BOTH EYES TWICE A DAY TO TREAT DRY EYES. TWIST AND PULL TOP  TO REMOVE. SINGLE USE CONTAINER.  sildenafil citrate (VIAGRA) 100 mg tablet Take 1 Tab by mouth daily as needed for Erectile Dysfunction.  ascorbic acid (VITAMIN C PO) Take  by mouth.  Lactobacillus acidophilus (PROBIOTIC PO) Take  by mouth.  FLAXSEED PO Take  by mouth.  cyanocobalamin 1,000 mcg tablet Take 1,000 mcg by mouth daily.  cholecalciferol (VITAMIN D3) 1,000 unit cap Take  by mouth daily. No current facility-administered medications for this visit. Allergies   Allergen Reactions    Pollen Extracts Runny Nose       Family History   Problem Relation Age of Onset    Cancer Mother     Cancer Father         reports that he has been smoking cigars. He has been smoking about 0.25 packs per day.  He has never used smokeless tobacco.   reports no history of alcohol use.      Depression Risk Factor Screening:       Alcohol Risk Factor Screening: On any occasion during the past 3 months, have you had more than 3 drinks containing alcohol? No    Do you average more than 14 drinks per week? No      Functional Ability and Level of Safety:     Hearing Loss   mild    Activities of Daily Living   Self-care. Requires assistance with: no ADLs    Fall Risk     Fall Risk Assessment, last 12 mths 4/7/2022   Able to walk? Yes   Fall in past 12 months? 0   Do you feel unsteady? 0   Are you worried about falling 0   Number of falls in past 12 months -   Fall with injury? -         Abuse Screen   Patient is not abused    Review of Systems   A comprehensive review of systems was negative except for that written in the HPI. Physical Examination     Evaluation of Cognitive Function:  Mood/affect:  neutral, happy  Appearance: age appropriate  Family member/caregiver input: none    Blood pressure 132/72, pulse 65, temperature 97.5 °F (36.4 °C), temperature source Oral, resp. rate 15, height 6' 2\" (1.88 m), weight 239 lb 12.8 oz (108.8 kg), SpO2 98 %. General appearance: alert, cooperative, no distress, appears stated age  Neck: supple, symmetrical, trachea midline, no adenopathy, thyroid: not enlarged, symmetric, no tenderness/mass/nodules, no carotid bruit and no JVD  Lungs: clear to auscultation bilaterally  Heart: regular rate and rhythm, S1, S2 normal, no murmur, click, rub or gallop  Extremities: extremities normal, atraumatic, no cyanosis or edema  PETR: Prostate is 2+, symmetric, no palpable nodules. Patient Care Team:  Iva Turner MD as PCP - General (Internal Medicine)  Iva Turner MD as PCP - [facility] Empaneled Provider      Advice/Referrals/Counseling   Education and counseling provided. See below for specific orders    Assessment/Plan     . Diagnoses and all orders for this visit:    1. Medicare annual wellness visit, subsequent  ACP reviewed.  Primary medical decision maker reviewed with patient and updated in chart. he is otherwise up-to-date or have declined preventative services except for those ordered below  He will follow up with diabetic eye exam.    2. 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 Brennon Marroquin. No acute findings today warrant change in management plan. 3. DM type 2 with diabetic peripheral neuropathy ([facility])  This condition is controlled on current medication regimen as written in medication list.  Medications refilled. -     CBC W/O DIFF; Future  -     METABOLIC PANEL, COMPREHENSIVE; Future  -     LIPID PANEL; Future  -     HEMOGLOBIN A1C WITH EAG; Future    4. SVT (supraventricular tachycardia) ([facility])  This condition appears to be stable and is being evaluated and managed by his specialist Dr Orlando Lugo. No acute findings today warrant change in management plan. 5. Benign essential HTN  This condition is controlled on current medication regimen as written in medication list.  Medications refilled. 6. Polyneuropathy  Chronic polyneuropathy, likely secondary to Agent Orange plus and minus diabetes. Remains relatively severe. Seeing neurology  Dr. Jossue Lopez    7. Prostate cancer screening. Normal exam today. PSA check. -     PSA W/ REFLX FREE PSA; Future      Potential medication side effects were discussed with the patient; let me know if any occur.   Return for yearly Annual Wellness Visits

## 2022-04-29 ENCOUNTER — OFFICE VISIT (OUTPATIENT)
Dept: NEUROLOGY | Age: 77
End: 2022-04-29
Payer: MEDICARE

## 2022-04-29 VITALS — HEART RATE: 64 BPM | BODY MASS INDEX: 30.67 KG/M2 | HEIGHT: 74 IN | WEIGHT: 239 LBS | OXYGEN SATURATION: 98 %

## 2022-04-29 DIAGNOSIS — G62.2 POLYNEUROPATHY DUE TO OTHER TOXIC AGENTS (HCC): ICD-10-CM

## 2022-04-29 DIAGNOSIS — M79.642 LEFT HAND PAIN: ICD-10-CM

## 2022-04-29 DIAGNOSIS — E08.42 DIABETIC POLYNEUROPATHY ASSOCIATED WITH DIABETES MELLITUS DUE TO UNDERLYING CONDITION (HCC): Primary | ICD-10-CM

## 2022-04-29 PROCEDURE — G8536 NO DOC ELDER MAL SCRN: HCPCS | Performed by: PSYCHIATRY & NEUROLOGY

## 2022-04-29 PROCEDURE — 1101F PT FALLS ASSESS-DOCD LE1/YR: CPT | Performed by: PSYCHIATRY & NEUROLOGY

## 2022-04-29 PROCEDURE — G8417 CALC BMI ABV UP PARAM F/U: HCPCS | Performed by: PSYCHIATRY & NEUROLOGY

## 2022-04-29 PROCEDURE — 99214 OFFICE O/P EST MOD 30 MIN: CPT | Performed by: PSYCHIATRY & NEUROLOGY

## 2022-04-29 PROCEDURE — 3046F HEMOGLOBIN A1C LEVEL >9.0%: CPT | Performed by: PSYCHIATRY & NEUROLOGY

## 2022-04-29 PROCEDURE — 2022F DILAT RTA XM EVC RTNOPTHY: CPT | Performed by: PSYCHIATRY & NEUROLOGY

## 2022-04-29 PROCEDURE — G8427 DOCREV CUR MEDS BY ELIG CLIN: HCPCS | Performed by: PSYCHIATRY & NEUROLOGY

## 2022-04-29 PROCEDURE — G8432 DEP SCR NOT DOC, RNG: HCPCS | Performed by: PSYCHIATRY & NEUROLOGY

## 2022-04-29 PROCEDURE — G8756 NO BP MEASURE DOC: HCPCS | Performed by: PSYCHIATRY & NEUROLOGY

## 2022-04-29 PROCEDURE — 3017F COLORECTAL CA SCREEN DOC REV: CPT | Performed by: PSYCHIATRY & NEUROLOGY

## 2022-04-29 NOTE — PROGRESS NOTES
Chief Complaint   Patient presents with    Follow-up     Diabetic polyneuropathy, \"my balance is off\"       HPI        Nikki Witt is a 53-year-old gentleman following up. I follow him for neuropathy in the setting of diabetes, agent orange exposure, history of alcoholism now in remission. Since I saw him last he feels like the numbness is getting worse becoming more intense but fortunately no pain. He finds that at night he needs to use a flashlight while walking and had to use a cane. He is also noticed some clicking in the right knee at times but no pain in the joint. .  . We completed an EMG which showed severe axonal neuropathy. He is doing his best to be very diligent about his diabetes. He sees the [place] and a [facility] doctor. He has mild symptoms in his fingertips. Background:  Mr. Melvin Schwarz is a 53-year-old gentleman, retired  in [address] [place] [address], here for neuropathy. He tells me that he has had diabetes for almost 30 years close to 20 years. He has no feeling in his feet and ankles also his hands. He is taken a few falls because he feels imbalanced. He also thinks his memory is not so good because he forgets people's names. He has no radicular pain. He takes oral medication to control his diabetes and he tells me last A1c was very stable. He also has evidence of neuropathy possibly contributed to by agent orange based on his [employer] compensation. Review of Systems   Musculoskeletal: Positive for joint pain. Negative for falls. Neurological: Positive for tingling and sensory change. All other systems reviewed and are negative.       Past Medical History:   Diagnosis Date    Arthritis     Benign essential HTN     Diabetes mellitus with microalbuminuria ([address]     DM type 2 with diabetic peripheral neuropathy ([address]     ED (erectile dysfunction)     History of trauma     served in [facility]    Hyponatremia     Na 128 7/2019, Na 131 5/7/20    Imbalance     due to neuropathy of legs, feet.  has cane since 2020 from [address] [place] [address] fiber neuropathy 10/2020    Dr. Kaur Goins. large fiber sensorimotor polyneuropathy. dx EMG.  Polyneuropathy     Dr. Judy Smith. hands, feet, balance. 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 Trent Benítez.  s/p ablation SVT/AVNRT 0/45/83    Umbilical hernia      Family History   Problem Relation Age of Onset    Cancer Mother     Cancer Father      Social History     Socioeconomic History    Marital status:      Spouse name: Anupam Corral Number of children: 2    Years of education: Not on file    Highest education level: Not on file   Occupational History    Occupation: Retired  [employer]    Tobacco Use    Smoking status: Current Every Day Smoker     Packs/day: 0.25     Types: Cigars    Smokeless tobacco: Never Used   Vaping Use    Vaping Use: Never used   Substance and Sexual Activity    Alcohol use: Never    Drug use: Not on file    Sexual activity: Not on file   Other Topics Concern    Not on file   Social History Narrative    Son age 40 w no children,     [place], age 36, 3 grandkids     Social Determinants of Health     Financial Resource Strain:     Difficulty of Paying Living Expenses: Not on file   Food Insecurity:     Worried About Running Out of Food in the Last Year: Not on file    Marcos of Food in the Last Year: Not on file   Transportation Needs:     Lack of Transportation (Medical): Not on file    Lack of Transportation (Non-Medical):  Not on file   Physical Activity:     Days of Exercise per Week: Not on file    Minutes of Exercise per Session: Not on file   Stress:     Feeling of Stress : Not on file   Social Connections:     Frequency of Communication with Friends and Family: Not on file    Frequency of Social Gatherings with Friends and Family: Not on file    Attends Congregational Services: Not on file    Active Member of Clubs or Organizations: Not on file    Attends Club or Organization Meetings: Not on file    Marital Status: Not on file   Intimate Partner Violence:     Fear of Current or Ex-Partner: Not on file    Emotionally Abused: Not on file    Physically Abused: Not on file    Sexually Abused: Not on file   Housing Stability:     Unable to Pay for Housing in the Last Year: Not on file    Number of Places Lived in the Last Year: Not on file    Unstable Housing in the Last Year: Not on file     Allergies   Allergen Reactions    Pollen Extracts Runny Nose         Current Outpatient Medications   Medication Sig    glipiZIDE SR (GLUCOTROL XL) 10 mg CR tablet TAKE 1 TABLET BY MOUTH EVERY DAY    metFORMIN (GLUCOPHAGE) 1,000 mg tablet TAKE 1 TABLET BY MOUTH TWICE A DAY    simvastatin (ZOCOR) 20 mg tablet TAKE 1 TABLET BY MOUTH EVERY DAY IN THE EVENING    lisinopriL (PRINIVIL, ZESTRIL) 20 mg tablet TAKE 1 TABLET BY MOUTH EVERY DAY    Blood-Glucose Meter monitoring kit 1 meter. E11.29    glucose blood VI test strips (ASCENSIA AUTODISC VI, ONE TOUCH ULTRA TEST VI) strip Check once daily. E11.29    lancets misc Use once daily    polyvinyl alcohol-povidon,PF, (REFRESH CLASSIC) 1.4-0.6 % ophthalmic solution INSTILL ONE DROP IN BOTH EYES TWICE A DAY TO TREAT DRY EYES. TWIST AND PULL TOP  TO REMOVE. SINGLE USE CONTAINER.  sildenafil citrate (VIAGRA) 100 mg tablet Take 1 Tab by mouth daily as needed for Erectile Dysfunction.  ascorbic acid (VITAMIN C PO) Take  by mouth.  Lactobacillus acidophilus (PROBIOTIC PO) Take  by mouth.  FLAXSEED PO Take  by mouth.  cyanocobalamin 1,000 mcg tablet Take 1,000 mcg by mouth daily.  cholecalciferol (VITAMIN D3) 1,000 unit cap Take  by mouth daily. No current facility-administered medications for this visit.            Neurologic Exam     Mental Status   WD/WN adult in NAD, normal grooming  VSS  A&O x 3    PERRL, nonicteric  Face is covered  Speech is fluent and clear  No limb ataxia. No abnl movements. Absent DTRs lower extremities  Moving all extemities spontaneously and symmetric  A little wide gait but steady  Left fifth digit on the hand with reduced extension and some slight joint edema             Visit Vitals  Pulse 64   Ht 6' 2\" (1.88 m)   Wt 239 lb (108.4 kg)   SpO2 98%   BMI 30.69 kg/m²       Assessment and Plan   Diagnoses and all orders for this visit:    1. Diabetic polyneuropathy associated with diabetes mellitus due to underlying condition ([facility]    2. 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    3. Left hand pain  -     REFERRAL TO ORTHOPEDIC SURGERY         72-year-old gentleman who has neuropathy I suspect multifactorial in the setting of diabetes, history of agent orange exposure in his past, contribution from alcohol dependence in his past now in remission. He is having a little bit of progression of the neuropathy manifesting as increasing numbness intensity but fortunately there is no pain. He needs to continue with his daily physical activity is much as possible to maintain his strength. On exam he does not have loss of strength with his reassuring. Continue to use a cane as needed for stability as well as turn the light on at night while walking in dark spaces. We talked about the clicking in his knee. It sounds orthopedic. He should consider seeing a knee specialist if he develops any pain. I also examined the left fifth digit which seems a little bit swollen and tender and he had some type of trauma. I am referring him to orthopedic hand specialty for that issue. 30 minutes of time was spent reviewing the medical record today, discussing the new symptoms in the hand and stable neuropathy symptoms, face-to-face time,  and completion of documentation. I reviewed and decided to continue the current medications.   This clinical note was dictated with an electronic dictation software that can make unintentional errors. If there are any questions, please contact me directly for clarification.       [address] [facility], [facility] [address]  Diplomate ABPN

## 2022-05-31 DIAGNOSIS — I10 BENIGN ESSENTIAL HTN: ICD-10-CM

## 2022-05-31 RX ORDER — LISINOPRIL 20 MG/1
TABLET ORAL
Qty: 90 TABLET | Refills: 1 | Status: SHIPPED | OUTPATIENT
Start: 2022-05-31

## 2022-08-11 RX ORDER — METFORMIN HYDROCHLORIDE 1000 MG/1
TABLET ORAL
Qty: 180 TABLET | Refills: 1 | Status: SHIPPED | OUTPATIENT
Start: 2022-08-11

## 2022-09-09 DIAGNOSIS — R80.9 DIABETES MELLITUS WITH MICROALBUMINURIA (HCC): ICD-10-CM

## 2022-09-09 DIAGNOSIS — E78.5 HYPERLIPIDEMIA, UNSPECIFIED: ICD-10-CM

## 2022-09-09 DIAGNOSIS — E11.29 DIABETES MELLITUS WITH MICROALBUMINURIA (HCC): ICD-10-CM

## 2022-09-09 RX ORDER — SIMVASTATIN 20 MG/1
TABLET, FILM COATED ORAL
Qty: 90 TABLET | Refills: 1 | Status: SHIPPED | OUTPATIENT
Start: 2022-09-09

## 2022-09-09 RX ORDER — GLIPIZIDE 10 MG/1
TABLET, FILM COATED, EXTENDED RELEASE ORAL
Qty: 90 TABLET | Refills: 1 | Status: SHIPPED | OUTPATIENT
Start: 2022-09-09

## 2022-10-06 ENCOUNTER — OFFICE VISIT (OUTPATIENT)
Dept: INTERNAL MEDICINE CLINIC | Age: 77
End: 2022-10-06
Payer: MEDICARE

## 2022-10-06 VITALS
HEIGHT: 74 IN | DIASTOLIC BLOOD PRESSURE: 72 MMHG | OXYGEN SATURATION: 98 % | WEIGHT: 244.4 LBS | BODY MASS INDEX: 31.37 KG/M2 | RESPIRATION RATE: 15 BRPM | SYSTOLIC BLOOD PRESSURE: 128 MMHG | HEART RATE: 54 BPM | TEMPERATURE: 97.8 F

## 2022-10-06 DIAGNOSIS — R29.898 WEAKNESS OF BOTH LEGS: ICD-10-CM

## 2022-10-06 DIAGNOSIS — I10 BENIGN ESSENTIAL HTN: ICD-10-CM

## 2022-10-06 DIAGNOSIS — Z23 NEEDS FLU SHOT: ICD-10-CM

## 2022-10-06 DIAGNOSIS — R80.9 DIABETES MELLITUS WITH MICROALBUMINURIA (HCC): ICD-10-CM

## 2022-10-06 DIAGNOSIS — G62.9 LARGE FIBER NEUROPATHY: ICD-10-CM

## 2022-10-06 DIAGNOSIS — Z12.5 SCREENING FOR PROSTATE CANCER: ICD-10-CM

## 2022-10-06 DIAGNOSIS — R26.89 BALANCE PROBLEM: ICD-10-CM

## 2022-10-06 DIAGNOSIS — E11.29 DIABETES MELLITUS WITH MICROALBUMINURIA (HCC): ICD-10-CM

## 2022-10-06 DIAGNOSIS — E11.42 DM TYPE 2 WITH DIABETIC PERIPHERAL NEUROPATHY (HCC): Primary | ICD-10-CM

## 2022-10-06 DIAGNOSIS — Z86.010 HISTORY OF COLON POLYPS: ICD-10-CM

## 2022-10-06 LAB — HBA1C MFR BLD HPLC: 6.5 %

## 2022-10-06 PROCEDURE — 90694 VACC AIIV4 NO PRSRV 0.5ML IM: CPT | Performed by: INTERNAL MEDICINE

## 2022-10-06 PROCEDURE — 83036 HEMOGLOBIN GLYCOSYLATED A1C: CPT | Performed by: INTERNAL MEDICINE

## 2022-10-06 PROCEDURE — 3017F COLORECTAL CA SCREEN DOC REV: CPT | Performed by: INTERNAL MEDICINE

## 2022-10-06 PROCEDURE — 99214 OFFICE O/P EST MOD 30 MIN: CPT | Performed by: INTERNAL MEDICINE

## 2022-10-06 PROCEDURE — G8754 DIAS BP LESS 90: HCPCS | Performed by: INTERNAL MEDICINE

## 2022-10-06 PROCEDURE — 3044F HG A1C LEVEL LT 7.0%: CPT | Performed by: INTERNAL MEDICINE

## 2022-10-06 PROCEDURE — G8432 DEP SCR NOT DOC, RNG: HCPCS | Performed by: INTERNAL MEDICINE

## 2022-10-06 PROCEDURE — 2022F DILAT RTA XM EVC RTNOPTHY: CPT | Performed by: INTERNAL MEDICINE

## 2022-10-06 PROCEDURE — G8417 CALC BMI ABV UP PARAM F/U: HCPCS | Performed by: INTERNAL MEDICINE

## 2022-10-06 PROCEDURE — G8427 DOCREV CUR MEDS BY ELIG CLIN: HCPCS | Performed by: INTERNAL MEDICINE

## 2022-10-06 PROCEDURE — G8536 NO DOC ELDER MAL SCRN: HCPCS | Performed by: INTERNAL MEDICINE

## 2022-10-06 PROCEDURE — G8752 SYS BP LESS 140: HCPCS | Performed by: INTERNAL MEDICINE

## 2022-10-06 PROCEDURE — G0463 HOSPITAL OUTPT CLINIC VISIT: HCPCS | Performed by: INTERNAL MEDICINE

## 2022-10-06 PROCEDURE — 1101F PT FALLS ASSESS-DOCD LE1/YR: CPT | Performed by: INTERNAL MEDICINE

## 2022-10-06 NOTE — PROGRESS NOTES
HISTORY OF PRESENT ILLNESS    Chief Complaint   Patient presents with    Diabetes     6 mo f/up    Hypertension    Labs     Fasting. Other     Would like exercise charts for back. Presents for follow-up    Walking 5-10k steps per day. Diabetes Mellitus follow-up  Last hemoglobin a1c   Lab Results   Component Value Date/Time    Hemoglobin A1c 6.8 (H) 08/12/2020 01:51 PM    Hemoglobin A1c (POC) 6.5 10/06/2022 10:56 AM    Hemoglobin A1c, External 8.2 03/10/2021 12:00 AM   medication compliance: compliant all of the time. Diabetic diet compliance: compliant most of the time. Home glucose monitoring: fasting values range none. Hypoglycemic episodes:  None. Further diabetic ROS: no polyuria or polydipsia, no chest pain, dyspnea or TIA's, no numbness, tingling or pain in extremities. 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 has a past surgical history that includes hx heent; pr cardiac surg procedure unlist; hx colonoscopy (10/10/2017); hx cataract removal (Left, 2003); hx cataract removal (Right, 2015); and hx svt ablation (06/15/2016). reports that he has been smoking cigars. He has been smoking an average of .25 packs per day. He has never used smokeless tobacco. He reports that he does not drink alcohol.  family history includes Cancer in his father and mother. Physical Exam   Nursing note and vitals reviewed.   Blood pressure 128/72, pulse (!) 54, temperature 97.8 °F (36.6 °C), temperature source Oral, resp. rate 15, height 6' 2\" (1.88 m), weight 244 lb 6.4 oz (110.9 kg), SpO2 98 %. Constitutional:  No distress. Eyes: Conjunctivae are normal.   Ears:  Hearing grossly intact  Cardiovascular: Normal rate. regular rhythm, no murmurs or gallops  No edema  Pulmonary/Chest: Effort normal.   CTAB  Musculoskeletal: moves all 4 extremities   Neurological: Alert and oriented to person, place, and time. Skin: No visible rash noted. Psychiatric: Normal mood and affect. Behavior is normal.     Assessment and Plan    Diagnoses and all orders for this visit:    1. DM type 2 with diabetic peripheral neuropathy ([facility]  2. Diabetes mellitus with microalbuminuria ([facility]  This condition is controlled by medication therapy with metformin, glipizide. Refilled medications. Return to clinic in 6 months to repeat your blood work. -     AMB POC HEMOGLOBIN U1C  -     METABOLIC PANEL, COMPREHENSIVE; Future  -     LIPID PANEL; Future  -     MICROALBUMIN, UR, RAND W/ MICROALB/CREAT RATIO; Future    3. Benign essential HTN  This condition is controlled by medication therapy with lisinopril. Refilled medications. -     CBC W/O DIFF; Future    4. Weakness of both legs  5. Large fiber neuropathy  6. Balance problem  Fall risk, progressive. He would benefit from PT for balance, strength, and to reduce fall risk  -     REFERRAL TO PHYSICAL THERAPY    7. History of colon polyps  Repeat colonoscopy scheduled for 1/2023.    8. Needs flu shot  -     INFLUENZA, FLUAD, (AGE 72 Y+), IM, PF, 0.5 ML    9. Screening for prostate cancer  -     PSA SCREENING (SCREENING);  Future      lab results and schedule of future lab studies reviewed with patient  reviewed medications and side effects in detail    Return to clinic for further evaluation if new symptoms develop        Current Outpatient Medications   Medication Sig    OTHER BEET POWDER    glipiZIDE SR (GLUCOTROL XL) 10 mg CR tablet TAKE 1 TABLET BY MOUTH EVERY DAY simvastatin (ZOCOR) 20 mg tablet TAKE 1 TABLET BY MOUTH EVERY DAY IN THE EVENING    metFORMIN (GLUCOPHAGE) 1,000 mg tablet TAKE 1 TABLET BY MOUTH TWICE A DAY    lisinopriL (PRINIVIL, ZESTRIL) 20 mg tablet TAKE 1 TABLET BY MOUTH EVERY DAY    Blood-Glucose Meter monitoring kit 1 meter. E11.29    glucose blood VI test strips (ASCENSIA AUTODISC VI, ONE TOUCH ULTRA TEST VI) strip Check once daily. E11.29    lancets misc Use once daily    polyvinyl alcohol-povidon,PF, (REFRESH CLASSIC) 1.4-0.6 % ophthalmic solution INSTILL ONE DROP IN BOTH EYES TWICE A DAY TO TREAT DRY EYES. TWIST AND PULL TOP  TO REMOVE. SINGLE USE CONTAINER. sildenafil citrate (VIAGRA) 100 mg tablet Take 1 Tab by mouth daily as needed for Erectile Dysfunction. ascorbic acid (VITAMIN C PO) Take  by mouth. Lactobacillus acidophilus (PROBIOTIC PO) Take  by mouth. FLAXSEED PO Take  by mouth.    cyanocobalamin 1,000 mcg tablet Take 1,000 mcg by mouth daily. cholecalciferol (VITAMIN D3) 25 mcg (1,000 unit) cap Take  by mouth daily. No current facility-administered medications for this visit.

## 2022-10-07 LAB
ALBUMIN SERPL-MCNC: 4 G/DL (ref 3.5–5)
ALBUMIN/GLOB SERPL: 1.3 {RATIO} (ref 1.1–2.2)
ALP SERPL-CCNC: 33 U/L (ref 45–117)
ALT SERPL-CCNC: 30 U/L (ref 12–78)
ANION GAP SERPL CALC-SCNC: 5 MMOL/L (ref 5–15)
AST SERPL-CCNC: 18 U/L (ref 15–37)
BILIRUB SERPL-MCNC: 0.9 MG/DL (ref 0.2–1)
BUN SERPL-MCNC: 13 MG/DL (ref 6–20)
BUN/CREAT SERPL: 14 (ref 12–20)
CALCIUM SERPL-MCNC: 9.4 MG/DL (ref 8.5–10.1)
CHLORIDE SERPL-SCNC: 101 MMOL/L (ref 97–108)
CHOLEST SERPL-MCNC: 95 MG/DL
CO2 SERPL-SCNC: 28 MMOL/L (ref 21–32)
CREAT SERPL-MCNC: 0.92 MG/DL (ref 0.7–1.3)
CREAT UR-MCNC: 27.8 MG/DL
ERYTHROCYTE [DISTWIDTH] IN BLOOD BY AUTOMATED COUNT: 12.1 % (ref 11.5–14.5)
GLOBULIN SER CALC-MCNC: 3 G/DL (ref 2–4)
GLUCOSE SERPL-MCNC: 124 MG/DL (ref 65–100)
HCT VFR BLD AUTO: 42.2 % (ref 36.6–50.3)
HDLC SERPL-MCNC: 46 MG/DL
HDLC SERPL: 2.1 {RATIO} (ref 0–5)
HGB BLD-MCNC: 14 G/DL (ref 12.1–17)
LDLC SERPL CALC-MCNC: 36.4 MG/DL (ref 0–100)
MCH RBC QN AUTO: 31.3 PG (ref 26–34)
MCHC RBC AUTO-ENTMCNC: 33.2 G/DL (ref 30–36.5)
MCV RBC AUTO: 94.4 FL (ref 80–99)
MICROALBUMIN UR-MCNC: 5.55 MG/DL
MICROALBUMIN/CREAT UR-RTO: 200 MG/G (ref 0–30)
NRBC # BLD: 0 K/UL (ref 0–0.01)
NRBC BLD-RTO: 0 PER 100 WBC
PLATELET # BLD AUTO: 179 K/UL (ref 150–400)
PMV BLD AUTO: 10.8 FL (ref 8.9–12.9)
POTASSIUM SERPL-SCNC: 4.1 MMOL/L (ref 3.5–5.1)
PROT SERPL-MCNC: 7 G/DL (ref 6.4–8.2)
PSA SERPL-MCNC: 0.8 NG/ML (ref 0.01–4)
RBC # BLD AUTO: 4.47 M/UL (ref 4.1–5.7)
SODIUM SERPL-SCNC: 134 MMOL/L (ref 136–145)
TRIGL SERPL-MCNC: 63 MG/DL (ref ?–150)
VLDLC SERPL CALC-MCNC: 12.6 MG/DL
WBC # BLD AUTO: 5.4 K/UL (ref 4.1–11.1)

## 2022-11-21 DIAGNOSIS — I10 BENIGN ESSENTIAL HTN: ICD-10-CM

## 2022-11-21 RX ORDER — LISINOPRIL 20 MG/1
TABLET ORAL
Qty: 90 TABLET | Refills: 1 | Status: SHIPPED | OUTPATIENT
Start: 2022-11-21

## 2023-02-08 RX ORDER — METFORMIN HYDROCHLORIDE 1000 MG/1
TABLET ORAL
Qty: 180 TABLET | Refills: 1 | Status: SHIPPED | OUTPATIENT
Start: 2023-02-08

## 2023-03-02 DIAGNOSIS — E11.29 DIABETES MELLITUS WITH MICROALBUMINURIA (HCC): ICD-10-CM

## 2023-03-02 DIAGNOSIS — R80.9 DIABETES MELLITUS WITH MICROALBUMINURIA (HCC): ICD-10-CM

## 2023-03-02 DIAGNOSIS — E78.5 HYPERLIPIDEMIA, UNSPECIFIED: ICD-10-CM

## 2023-03-02 RX ORDER — SIMVASTATIN 20 MG/1
TABLET, FILM COATED ORAL
Qty: 90 TABLET | Refills: 1 | Status: SHIPPED | OUTPATIENT
Start: 2023-03-02

## 2023-03-02 RX ORDER — GLIPIZIDE 10 MG/1
TABLET, FILM COATED, EXTENDED RELEASE ORAL
Qty: 90 TABLET | Refills: 1 | Status: SHIPPED | OUTPATIENT
Start: 2023-03-02

## 2023-03-27 NOTE — PROGRESS NOTES
Chief Complaint   Patient presents with    Follow-up     Neuropathy in bilateral LE       HPI    Mr Fabiana Rodriguez is a 78year old gentleman here for follow up. He is a new patient for me. He was last seen by Dr Leeanne Herndon on 4/29/22 for DM neuropathy. He had a EMG which showed severe axonal neuropathy. He is doing his best to be very diligent about his diabetes. He sees the [place] and a community doctor. He also has a history of alcohol dependence now in remission. He is here today reporting that everything is well. His neuropathy is about the same. He is walking a lot with his wife. He gets his DM checked at the [facility] and they check and cut his feet for him. He is doing his best to keep his DM under tight control. He does have moderate numbness in both feet, but the pain and burning is not that bad. BACKGROUND  Mr. Fabiana Rodriguez is a 81-year-old gentleman, retired  in [address] [place] [address], here for neuropathy. He tells me that he has had diabetes for almost 30 years close to 20 years. He has no feeling in his feet and ankles also his hands. He is taken a few falls because he feels imbalanced. He also thinks his memory is not so good because he forgets people's names. He has no radicular pain. He takes oral medication to control his diabetes and he tells me last A1c was very stable. He also has evidence of neuropathy possibly contributed to by agent orange based on his VA compensation. Review of Systems   Musculoskeletal:  Negative for falls. Neurological:  Positive for tingling. Negative for weakness.      Past Medical History:   Diagnosis Date    Arthritis     Benign essential HTN     Diabetes mellitus with microalbuminuria ([facility]     DM type 2 with diabetic peripheral neuropathy [facility])     ED (erectile dysfunction)     History of trauma     [facility]    Hyponatremia     Na 128 7/2019, Na 131 5/7/20    Imbalance     due to neuropathy of legs, feet.  has cane since 2020 from [facility] Large fiber neuropathy 10/2020    Dr. Myesha Yepez. large fiber sensorimotor polyneuropathy. dx EMG. Polyneuropathy     Dr. Serenity Salas. hands, feet, balance. 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 Arielle Velez.  s/p ablation SVT/AVNRT 4/76/29    Umbilical hernia      Family History   Problem Relation Age of Onset    Cancer Mother     Cancer Father      Social History     Socioeconomic History    Marital status:      Spouse name: Veronika Nation    Number of children: 2    Years of education: Not on file    Highest education level: Not on file   Occupational History    Occupation: Retired  [employer]    Tobacco Use    Smoking status: Every Day     Packs/day: 0.25     Types: Cigars, Cigarettes    Smokeless tobacco: Never   Vaping Use    Vaping Use: Never used   Substance and Sexual Activity    Alcohol use: Never    Drug use: Not on file    Sexual activity: Not on file   Other Topics Concern    Not on file   Social History Narrative    Son age 40 w no children,     [place] in [place], age 36, 3 grandkids     Social Determinants of Health     Financial Resource Strain: Not on file   Food Insecurity: Not on file   Transportation Needs: Not on file   Physical Activity: Not on file   Stress: Not on file   Social Connections: Not on file   Intimate Partner Violence: Not on file   Housing Stability: Not on file     Allergies   Allergen Reactions    Pollen Extracts Runny Nose         Current Outpatient Medications   Medication Sig    glipiZIDE SR (GLUCOTROL XL) 10 mg CR tablet TAKE 1 TABLET BY MOUTH EVERY DAY    simvastatin (ZOCOR) 20 mg tablet TAKE 1 TABLET BY MOUTH EVERY DAY IN THE EVENING    metFORMIN (GLUCOPHAGE) 1,000 mg tablet TAKE 1 TABLET BY MOUTH TWICE A DAY    lisinopriL (PRINIVIL, ZESTRIL) 20 mg tablet TAKE 1 TABLET BY MOUTH EVERY DAY    Blood-Glucose Meter monitoring kit 1 meter.   E11.29 glucose blood VI test strips (ASCENSIA AUTODISC VI, ONE TOUCH ULTRA TEST VI) strip Check once daily. E11.29    lancets misc Use once daily    polyvinyl alcohol-povidon,PF, (REFRESH CLASSIC) 1.4-0.6 % ophthalmic solution INSTILL ONE DROP IN BOTH EYES TWICE A DAY TO TREAT DRY EYES. TWIST AND PULL TOP  TO REMOVE. SINGLE USE CONTAINER. sildenafil citrate (VIAGRA) 100 mg tablet Take 1 Tab by mouth daily as needed for Erectile Dysfunction. ascorbic acid (VITAMIN C PO) Take  by mouth. Lactobacillus acidophilus (PROBIOTIC PO) Take  by mouth. FLAXSEED PO Take  by mouth.    cyanocobalamin 1,000 mcg tablet Take 1,000 mcg by mouth daily. cholecalciferol (VITAMIN D3) 25 mcg (1,000 unit) cap Take  by mouth daily. No current facility-administered medications for this visit. Neurologic Exam     Mental Status   Oriented to person, place, and time. Speech: speech is normal   Level of consciousness: alert    Cranial Nerves   Cranial nerves II through XII intact. Motor Exam   Muscle bulk: normal    Strength   Strength 5/5 throughout. Sensory Exam   Right leg light touch: decreased from toes  Left leg light touch: decreased from toes  Right leg vibration: decreased from toes  Left leg vibration: decreased from toes    Gait, Coordination, and Reflexes     Gait  Gait: normal    Visit Vitals  /75 (BP 1 Location: Left arm, BP Patient Position: Sitting, BP Cuff Size: Adult)   Pulse 70   Resp 18   Ht 6' 2\" (1.88 m)   Wt 244 lb (110.7 kg)   SpO2 96%   BMI 31.33 kg/m²         CT Results (maximum last 3): No results found for this or any previous visit. MRI Results (maximum last 3): No results found for this or any previous visit. Follow-up and Dispositions    Return if symptoms worsen or fail to improve. Assessment and Plan   Diagnoses and all orders for this visit:    1.  Diabetic polyneuropathy associated with diabetes mellitus due to underlying condition [facility])    78year old man with DM polyneuropathy that seems to be stable. He is currently not on any medication for his neuropathy. It is mostly numbness at this point. His pain is tolerable. He is walking frequently and doing good. He checks his feet and goes to the [place] for his toenails. We talked about tight DM control and he is trying. On exam he looks well, decreased sensation in the feet but no real loss of strength. He isn't using a cane anymore. His left ankle has moderate arthritis in it and that's the only pain that he experiences when walking. He does not need to follow up with us for now. If his feet start to really bother him and he would like to try some medication he can reach out to us and we can bring him back in for a evaluation. I spent 35 minutes of time today reviewing the medical record and notes, imaging, examining the patient, and face-to-face time, patient/family teaching and medication side effects, and time spent completing documentation.         FRANDY Coronado

## 2023-03-28 ENCOUNTER — OFFICE VISIT (OUTPATIENT)
Dept: NEUROLOGY | Age: 78
End: 2023-03-28
Payer: MEDICARE

## 2023-03-28 VITALS
BODY MASS INDEX: 31.32 KG/M2 | OXYGEN SATURATION: 96 % | SYSTOLIC BLOOD PRESSURE: 131 MMHG | DIASTOLIC BLOOD PRESSURE: 75 MMHG | RESPIRATION RATE: 18 BRPM | HEIGHT: 74 IN | WEIGHT: 244 LBS | HEART RATE: 70 BPM

## 2023-03-28 DIAGNOSIS — E08.42 DIABETIC POLYNEUROPATHY ASSOCIATED WITH DIABETES MELLITUS DUE TO UNDERLYING CONDITION (HCC): Primary | ICD-10-CM

## 2023-03-28 PROCEDURE — 3017F COLORECTAL CA SCREEN DOC REV: CPT

## 2023-03-28 PROCEDURE — G8417 CALC BMI ABV UP PARAM F/U: HCPCS

## 2023-03-28 PROCEDURE — G8432 DEP SCR NOT DOC, RNG: HCPCS

## 2023-03-28 PROCEDURE — 1101F PT FALLS ASSESS-DOCD LE1/YR: CPT

## 2023-03-28 PROCEDURE — G8536 NO DOC ELDER MAL SCRN: HCPCS

## 2023-03-28 PROCEDURE — 3078F DIAST BP <80 MM HG: CPT

## 2023-03-28 PROCEDURE — G8427 DOCREV CUR MEDS BY ELIG CLIN: HCPCS

## 2023-03-28 PROCEDURE — 99214 OFFICE O/P EST MOD 30 MIN: CPT

## 2023-03-28 PROCEDURE — 1123F ACP DISCUSS/DSCN MKR DOCD: CPT

## 2023-03-28 PROCEDURE — 3075F SYST BP GE 130 - 139MM HG: CPT

## 2023-03-28 NOTE — PROGRESS NOTES
Chief Complaint   Patient presents with    Follow-up     Neuropathy in bilateral LE    Visit Vitals  /75 (BP 1 Location: Left arm, BP Patient Position: Sitting, BP Cuff Size: Adult)   Pulse 70   Resp 18   Ht 6' 2\" (1.88 m)   Wt 244 lb (110.7 kg)   SpO2 96%   BMI 31.33 kg/m²

## 2023-05-15 DIAGNOSIS — I10 ESSENTIAL (PRIMARY) HYPERTENSION: ICD-10-CM

## 2023-05-15 RX ORDER — LISINOPRIL 20 MG/1
TABLET ORAL
Qty: 90 TABLET | Refills: 1 | Status: SHIPPED | OUTPATIENT
Start: 2023-05-15

## 2023-05-26 RX ORDER — GLIPIZIDE 10 MG/1
1 TABLET, FILM COATED, EXTENDED RELEASE ORAL DAILY
COMMUNITY
Start: 2023-03-02

## 2023-05-26 RX ORDER — LANCETS 30 GAUGE
EACH MISCELLANEOUS
COMMUNITY
Start: 2021-07-07

## 2023-05-26 RX ORDER — SIMVASTATIN 20 MG
1 TABLET ORAL EVERY EVENING
COMMUNITY
Start: 2023-03-02

## 2023-05-26 RX ORDER — SILDENAFIL 100 MG/1
100 TABLET, FILM COATED ORAL DAILY PRN
COMMUNITY
Start: 2021-04-05

## 2023-05-26 RX ORDER — LISINOPRIL 20 MG/1
1 TABLET ORAL DAILY
COMMUNITY
Start: 2022-11-21

## 2023-08-04 DIAGNOSIS — E11.42 TYPE 2 DIABETES MELLITUS WITH DIABETIC POLYNEUROPATHY, UNSPECIFIED WHETHER LONG TERM INSULIN USE (HCC): Primary | ICD-10-CM

## 2023-08-18 ENCOUNTER — TELEPHONE (OUTPATIENT)
Age: 78
End: 2023-08-18

## 2023-08-18 DIAGNOSIS — E11.42 TYPE 2 DIABETES MELLITUS WITH DIABETIC POLYNEUROPATHY, UNSPECIFIED WHETHER LONG TERM INSULIN USE (HCC): Primary | ICD-10-CM

## 2023-08-18 RX ORDER — BLOOD-GLUCOSE METER
1 EACH MISCELLANEOUS DAILY
Qty: 1 KIT | Refills: 0 | Status: SHIPPED | OUTPATIENT
Start: 2023-08-18

## 2023-08-18 NOTE — TELEPHONE ENCOUNTER
Patient reports his Glucometer is not working and request a new One Touch Meter. Script sent to his [facility] as requested.

## 2023-08-25 DIAGNOSIS — E78.5 HYPERLIPIDEMIA, UNSPECIFIED: ICD-10-CM

## 2023-08-25 DIAGNOSIS — E11.29 TYPE 2 DIABETES MELLITUS WITH OTHER DIABETIC KIDNEY COMPLICATION (HCC): ICD-10-CM

## 2023-08-25 RX ORDER — GLIPIZIDE 10 MG/1
TABLET, FILM COATED, EXTENDED RELEASE ORAL
Qty: 30 TABLET | Refills: 0 | Status: SHIPPED | OUTPATIENT
Start: 2023-08-25

## 2023-08-25 RX ORDER — SIMVASTATIN 20 MG
TABLET ORAL
Qty: 30 TABLET | Refills: 0 | Status: SHIPPED | OUTPATIENT
Start: 2023-08-25

## 2023-09-19 DIAGNOSIS — E11.29 TYPE 2 DIABETES MELLITUS WITH OTHER DIABETIC KIDNEY COMPLICATION (HCC): ICD-10-CM

## 2023-09-19 DIAGNOSIS — E78.5 HYPERLIPIDEMIA, UNSPECIFIED: ICD-10-CM

## 2023-09-19 RX ORDER — SIMVASTATIN 20 MG
TABLET ORAL
Qty: 90 TABLET | Refills: 2 | Status: SHIPPED | OUTPATIENT
Start: 2023-09-19

## 2023-09-19 RX ORDER — GLIPIZIDE 10 MG/1
TABLET, FILM COATED, EXTENDED RELEASE ORAL
Qty: 90 TABLET | Refills: 2 | Status: SHIPPED | OUTPATIENT
Start: 2023-09-19

## 2023-09-19 NOTE — TELEPHONE ENCOUNTER
Spoke with patient and advised he is due to see Dr. Sabina Cope for a yearly follow up. Patient did not want to schedule for a full physical but wanted a half a physical. Patient scheduled for Office visit for Med Review/ Labs on 10/11/23 at 0800 AM. He requests that he be given a gown and be examined. Advised that I would let Dr. Sabina Cope know. Allowed patient to voice his concerns with healthcare turning into a [employer]. Grateful for the call.

## 2023-09-30 ENCOUNTER — TELEPHONE (OUTPATIENT)
Age: 78
End: 2023-09-30

## 2023-10-02 ENCOUNTER — TELEPHONE (OUTPATIENT)
Age: 78
End: 2023-10-02

## 2023-10-02 NOTE — TELEPHONE ENCOUNTER
Spoke with Mary/Spouse (HIPPA VERIFIED) see reports that patient went to ED [facility] on 9/30/23. Advised to contact GI Dr. Marily Cabral  [facility]: [address] [place] [address] # [phone]. Dr. Marily Cabral did patients last colonoscopy in 2020. She reports that patient is to have a pacemaker placed on 10/19/23. Advised her to let the GI know that also. She states understanding and grateful for the call. Records requested. Dr. Adelfo Stephens notified.

## 2023-10-10 ENCOUNTER — TELEPHONE (OUTPATIENT)
Age: 78
End: 2023-10-10

## 2023-10-10 NOTE — TELEPHONE ENCOUNTER
Spoke with patient and he is in agreement in changing his appt to 10/12/23 at 0920 AM with Dr. Jaydon Will. Grateful for the call.

## 2023-10-12 ENCOUNTER — OFFICE VISIT (OUTPATIENT)
Age: 78
End: 2023-10-12
Payer: MEDICARE

## 2023-10-12 VITALS
DIASTOLIC BLOOD PRESSURE: 72 MMHG | OXYGEN SATURATION: 96 % | HEART RATE: 62 BPM | RESPIRATION RATE: 19 BRPM | BODY MASS INDEX: 30.83 KG/M2 | HEIGHT: 74 IN | SYSTOLIC BLOOD PRESSURE: 120 MMHG | WEIGHT: 240.2 LBS | TEMPERATURE: 97.7 F

## 2023-10-12 DIAGNOSIS — K57.31 DIVERTICULAR HEMORRHAGE: ICD-10-CM

## 2023-10-12 DIAGNOSIS — Z12.5 SCREENING FOR PROSTATE CANCER: ICD-10-CM

## 2023-10-12 DIAGNOSIS — I47.10 SVT (SUPRAVENTRICULAR TACHYCARDIA): ICD-10-CM

## 2023-10-12 DIAGNOSIS — I44.1 SECOND DEGREE AV BLOCK: ICD-10-CM

## 2023-10-12 DIAGNOSIS — Z23 NEEDS FLU SHOT: ICD-10-CM

## 2023-10-12 DIAGNOSIS — Z00.00 MEDICARE ANNUAL WELLNESS VISIT, SUBSEQUENT: Primary | ICD-10-CM

## 2023-10-12 DIAGNOSIS — G62.2 POLYNEUROPATHY DUE TO OTHER TOXIC AGENTS (HCC): ICD-10-CM

## 2023-10-12 DIAGNOSIS — E11.29 TYPE 2 DIABETES MELLITUS WITH OTHER DIABETIC KIDNEY COMPLICATION (HCC): ICD-10-CM

## 2023-10-12 DIAGNOSIS — I10 BENIGN ESSENTIAL HTN: ICD-10-CM

## 2023-10-12 PROBLEM — H26.9 CATARACT: Status: ACTIVE | Noted: 2023-10-12

## 2023-10-12 PROBLEM — K92.2 GI BLEED: Status: ACTIVE | Noted: 2023-10-03

## 2023-10-12 PROCEDURE — 99215 OFFICE O/P EST HI 40 MIN: CPT | Performed by: INTERNAL MEDICINE

## 2023-10-12 PROCEDURE — 90694 VACC AIIV4 NO PRSRV 0.5ML IM: CPT | Performed by: INTERNAL MEDICINE

## 2023-10-12 RX ORDER — GLIPIZIDE 5 MG/1
5 TABLET, FILM COATED, EXTENDED RELEASE ORAL DAILY
Qty: 90 TABLET | Refills: 1
Start: 2023-10-12

## 2023-10-12 RX ORDER — LISINOPRIL 20 MG/1
TABLET ORAL
Qty: 90 TABLET | Refills: 1
Start: 2023-10-12

## 2023-10-12 SDOH — ECONOMIC STABILITY: INCOME INSECURITY: HOW HARD IS IT FOR YOU TO PAY FOR THE VERY BASICS LIKE FOOD, HOUSING, MEDICAL CARE, AND HEATING?: NOT HARD AT ALL

## 2023-10-12 SDOH — ECONOMIC STABILITY: FOOD INSECURITY: WITHIN THE PAST 12 MONTHS, YOU WORRIED THAT YOUR FOOD WOULD RUN OUT BEFORE YOU GOT MONEY TO BUY MORE.: NEVER TRUE

## 2023-10-12 SDOH — ECONOMIC STABILITY: FOOD INSECURITY: WITHIN THE PAST 12 MONTHS, THE FOOD YOU BOUGHT JUST DIDN'T LAST AND YOU DIDN'T HAVE MONEY TO GET MORE.: NEVER TRUE

## 2023-10-12 SDOH — ECONOMIC STABILITY: HOUSING INSECURITY
IN THE LAST 12 MONTHS, WAS THERE A TIME WHEN YOU DID NOT HAVE A STEADY PLACE TO SLEEP OR SLEPT IN A SHELTER (INCLUDING NOW)?: NO

## 2023-10-12 ASSESSMENT — PATIENT HEALTH QUESTIONNAIRE - PHQ9
SUM OF ALL RESPONSES TO PHQ9 QUESTIONS 1 & 2: 0
SUM OF ALL RESPONSES TO PHQ QUESTIONS 1-9: 0
2. FEELING DOWN, DEPRESSED OR HOPELESS: 0
SUM OF ALL RESPONSES TO PHQ QUESTIONS 1-9: 0
1. LITTLE INTEREST OR PLEASURE IN DOING THINGS: 0

## 2023-10-12 ASSESSMENT — LIFESTYLE VARIABLES
HOW OFTEN DO YOU HAVE A DRINK CONTAINING ALCOHOL: NEVER
HOW MANY STANDARD DRINKS CONTAINING ALCOHOL DO YOU HAVE ON A TYPICAL DAY: PATIENT DOES NOT DRINK

## 2023-10-12 NOTE — PROGRESS NOTES
Patient present for routine immunizations. Pt denies any symptoms , reactions or allergies that would exclude them from being immunized today. Risks and adverse reactions were discussed and the VIS was given to them. All questions were addressed. Pt was observed for 10 min post injection. There were no reactions observed.
Moisés Watt MD   glipiZIDE (GLUCOTROL XL) 5 MG extended release tablet Take 1 tablet by mouth daily Yes Moisés Watt MD   simvastatin (ZOCOR) 20 MG tablet TAKE 1 TABLET BY MOUTH EVERY DAY IN THE EVENING Yes Moisés Watt MD   Blood Glucose Monitoring Suppl (ONE TOUCH ULTRA 2) w/Device KIT 1 kit by Does not apply route daily Yes Moisés Watt MD   metFORMIN (GLUCOPHAGE) 1000 MG tablet TAKE 1 TABLET BY MOUTH TWICE A DAY Yes Moisés Watt MD   Lancets MISC Use once daily Yes Automatic Reconciliation, Ar   vitamin D 25 MCG (1000 UT) CAPS Take by mouth daily Yes Automatic Reconciliation, Ar   cyanocobalamin 1000 MCG tablet Take 1 tablet by mouth daily Yes Automatic Reconciliation, Ar   Polyvinyl Alcohol-Povidone PF (REFRESH) 1.4-0.6 % SOLN ophthalmic solution INSTILL ONE DROP IN BOTH EYES TWICE A DAY TO TREAT DRY EYES. TWIST AND PULL TOP  TO REMOVE. SINGLE USE CONTAINER.  Yes Automatic Reconciliation, Ar   sildenafil (VIAGRA) 100 MG tablet Take 1 tablet by mouth daily as needed Yes Automatic Reconciliation, Ar       CareTeam (Including outside providers/suppliers regularly involved in providing care):   Patient Care Team:  Moisés Watt MD as PCP - [facility], Peter Toledo MD as PCP - [facility]     Reviewed and updated this visit:  Tobacco  Allergies  Meds  Problems  Med Hx  Surg Hx  Soc Hx  Fam Hx

## 2023-10-13 LAB
ANION GAP SERPL CALC-SCNC: 5 MMOL/L (ref 5–15)
BUN SERPL-MCNC: 10 MG/DL (ref 6–20)
BUN/CREAT SERPL: 10 (ref 12–20)
CALCIUM SERPL-MCNC: 9.5 MG/DL (ref 8.5–10.1)
CHLORIDE SERPL-SCNC: 101 MMOL/L (ref 97–108)
CHOLEST SERPL-MCNC: 108 MG/DL
CO2 SERPL-SCNC: 27 MMOL/L (ref 21–32)
CREAT SERPL-MCNC: 0.99 MG/DL (ref 0.7–1.3)
ERYTHROCYTE [DISTWIDTH] IN BLOOD BY AUTOMATED COUNT: 12.6 % (ref 11.5–14.5)
EST. AVERAGE GLUCOSE BLD GHB EST-MCNC: 148 MG/DL
GLUCOSE SERPL-MCNC: 222 MG/DL (ref 65–100)
HBA1C MFR BLD: 6.8 % (ref 4–5.6)
HCT VFR BLD AUTO: 34.6 % (ref 36.6–50.3)
HDLC SERPL-MCNC: 54 MG/DL
HDLC SERPL: 2 (ref 0–5)
HGB BLD-MCNC: 11 G/DL (ref 12.1–17)
LDLC SERPL CALC-MCNC: 41.2 MG/DL (ref 0–100)
MCH RBC QN AUTO: 30.6 PG (ref 26–34)
MCHC RBC AUTO-ENTMCNC: 31.8 G/DL (ref 30–36.5)
MCV RBC AUTO: 96.1 FL (ref 80–99)
NRBC # BLD: 0 K/UL (ref 0–0.01)
NRBC BLD-RTO: 0 PER 100 WBC
PLATELET # BLD AUTO: 233 K/UL (ref 150–400)
PMV BLD AUTO: 10.3 FL (ref 8.9–12.9)
POTASSIUM SERPL-SCNC: 4.3 MMOL/L (ref 3.5–5.1)
PSA SERPL-MCNC: 0.9 NG/ML (ref 0.01–4)
RBC # BLD AUTO: 3.6 M/UL (ref 4.1–5.7)
SODIUM SERPL-SCNC: 133 MMOL/L (ref 136–145)
TRIGL SERPL-MCNC: 64 MG/DL
VLDLC SERPL CALC-MCNC: 12.8 MG/DL
WBC # BLD AUTO: 4.9 K/UL (ref 4.1–11.1)

## 2023-10-29 DIAGNOSIS — E11.42 TYPE 2 DIABETES MELLITUS WITH DIABETIC POLYNEUROPATHY, UNSPECIFIED WHETHER LONG TERM INSULIN USE (HCC): ICD-10-CM

## 2023-11-01 ENCOUNTER — TELEPHONE (OUTPATIENT)
Age: 78
End: 2023-11-01

## 2023-11-01 NOTE — TELEPHONE ENCOUNTER
The patient is requesting a call back he  Wanted to ask information (dates)about past vaccines (Flu). Wife Melinda Hermosillo stated please leave information on the VM if no one answer.   [phone]

## 2023-11-01 NOTE — TELEPHONE ENCOUNTER
Spoke with Mary/Spouse (Hippa VERIFIED) She reports that patient received his Flu vaccine at his appt on 10/12/23 and then received a 2nd one today at the [place]. She wanted to know if it would hurt him. She wanted to let Dr. Bharathi Cunningham know. Dr. Bharathi Cunningham notified and he Advised that it will not hurt him. She states understanding and grateful for the call.

## 2023-11-10 DIAGNOSIS — I10 BENIGN ESSENTIAL HTN: ICD-10-CM

## 2023-11-10 RX ORDER — LISINOPRIL 10 MG/1
10 TABLET ORAL DAILY
Qty: 90 TABLET | Refills: 1 | Status: SHIPPED | OUTPATIENT
Start: 2023-11-10

## 2023-11-29 ENCOUNTER — TELEPHONE (OUTPATIENT)
Age: 78
End: 2023-11-29

## 2023-11-29 DIAGNOSIS — E11.42 TYPE 2 DIABETES MELLITUS WITH DIABETIC POLYNEUROPATHY, UNSPECIFIED WHETHER LONG TERM INSULIN USE (HCC): Primary | ICD-10-CM

## 2023-11-29 RX ORDER — GLUCOSAMINE HCL/CHONDROITIN SU 500-400 MG
CAPSULE ORAL
Qty: 300 STRIP | Refills: 0 | Status: SHIPPED | OUTPATIENT
Start: 2023-11-29

## 2023-11-29 NOTE — TELEPHONE ENCOUNTER
The patient wife Raul Huang is requesting a RX refill for (one tough ultra test strips) sent to the pharmacy below    [address] [facility], [address] [place] [address] [phone]

## 2023-12-08 ENCOUNTER — TELEPHONE (OUTPATIENT)
Age: 78
End: 2023-12-08

## 2023-12-08 NOTE — TELEPHONE ENCOUNTER
Spoke with patient and he reports that he is having hyper glycemia 213 post eating breakfast-asymptomatic of any diabetic distress. Reassured patient that his blood sugar may be within normal range post eating. Advised patient that he needs to take his fasting blood sugar in AM prior to eating anything and before bed at night and document the results and let Dr. Sabina Cope know on Monday what they are. He states understanding. He has scheduled a 3 month follow up with Dr. Sabina Cope on  1/10/23 at 2:20 PM.  Grateful for the call.

## 2024-01-10 ENCOUNTER — OFFICE VISIT (OUTPATIENT)
Age: 79
End: 2024-01-10
Payer: MEDICARE

## 2024-01-10 VITALS
BODY MASS INDEX: 31.61 KG/M2 | SYSTOLIC BLOOD PRESSURE: 138 MMHG | DIASTOLIC BLOOD PRESSURE: 72 MMHG | RESPIRATION RATE: 18 BRPM | HEART RATE: 74 BPM | TEMPERATURE: 97.7 F | OXYGEN SATURATION: 95 % | WEIGHT: 246.3 LBS | HEIGHT: 74 IN

## 2024-01-10 DIAGNOSIS — I10 BENIGN ESSENTIAL HTN: ICD-10-CM

## 2024-01-10 DIAGNOSIS — D50.0 ANEMIA, BLOOD LOSS: ICD-10-CM

## 2024-01-10 DIAGNOSIS — L97.521 SKIN ULCER OF TOE OF LEFT FOOT, LIMITED TO BREAKDOWN OF SKIN (HCC): ICD-10-CM

## 2024-01-10 DIAGNOSIS — Z87.19 HISTORY OF GI DIVERTICULAR BLEED: ICD-10-CM

## 2024-01-10 DIAGNOSIS — Z95.0 PACEMAKER: ICD-10-CM

## 2024-01-10 DIAGNOSIS — G62.2 POLYNEUROPATHY DUE TO OTHER TOXIC AGENTS (HCC): ICD-10-CM

## 2024-01-10 DIAGNOSIS — R53.83 OTHER FATIGUE: ICD-10-CM

## 2024-01-10 DIAGNOSIS — R53.83 OTHER FATIGUE: Primary | ICD-10-CM

## 2024-01-10 DIAGNOSIS — E11.42 DM TYPE 2 WITH DIABETIC PERIPHERAL NEUROPATHY (HCC): ICD-10-CM

## 2024-01-10 PROBLEM — K57.31 DIVERTICULAR HEMORRHAGE: Status: RESOLVED | Noted: 2023-09-28 | Resolved: 2024-01-10

## 2024-01-10 PROBLEM — R00.2 PALPITATIONS: Status: ACTIVE | Noted: 2020-04-08

## 2024-01-10 PROBLEM — I45.10 RIGHT BUNDLE BRANCH BLOCK: Status: ACTIVE | Noted: 2020-04-08

## 2024-01-10 PROBLEM — K92.2 GI BLEED: Status: RESOLVED | Noted: 2023-10-03 | Resolved: 2024-01-10

## 2024-01-10 LAB
COMMENT:: NORMAL
HBA1C MFR BLD: 7.1 %
SPECIMEN HOLD: NORMAL

## 2024-01-10 PROCEDURE — 83036 HEMOGLOBIN GLYCOSYLATED A1C: CPT | Performed by: INTERNAL MEDICINE

## 2024-01-10 PROCEDURE — G8417 CALC BMI ABV UP PARAM F/U: HCPCS | Performed by: INTERNAL MEDICINE

## 2024-01-10 PROCEDURE — 3078F DIAST BP <80 MM HG: CPT | Performed by: INTERNAL MEDICINE

## 2024-01-10 PROCEDURE — 1123F ACP DISCUSS/DSCN MKR DOCD: CPT | Performed by: INTERNAL MEDICINE

## 2024-01-10 PROCEDURE — PBSHW AMB POC HEMOGLOBIN A1C: Performed by: INTERNAL MEDICINE

## 2024-01-10 PROCEDURE — 3075F SYST BP GE 130 - 139MM HG: CPT | Performed by: INTERNAL MEDICINE

## 2024-01-10 PROCEDURE — G8484 FLU IMMUNIZE NO ADMIN: HCPCS | Performed by: INTERNAL MEDICINE

## 2024-01-10 PROCEDURE — G8427 DOCREV CUR MEDS BY ELIG CLIN: HCPCS | Performed by: INTERNAL MEDICINE

## 2024-01-10 PROCEDURE — 99214 OFFICE O/P EST MOD 30 MIN: CPT | Performed by: INTERNAL MEDICINE

## 2024-01-10 PROCEDURE — 4004F PT TOBACCO SCREEN RCVD TLK: CPT | Performed by: INTERNAL MEDICINE

## 2024-01-10 ASSESSMENT — PATIENT HEALTH QUESTIONNAIRE - PHQ9
1. LITTLE INTEREST OR PLEASURE IN DOING THINGS: 0
2. FEELING DOWN, DEPRESSED OR HOPELESS: 1
SUM OF ALL RESPONSES TO PHQ QUESTIONS 1-9: 1
SUM OF ALL RESPONSES TO PHQ QUESTIONS 1-9: 1
SUM OF ALL RESPONSES TO PHQ9 QUESTIONS 1 & 2: 1
SUM OF ALL RESPONSES TO PHQ QUESTIONS 1-9: 1
SUM OF ALL RESPONSES TO PHQ QUESTIONS 1-9: 1

## 2024-01-10 NOTE — PROGRESS NOTES
HISTORY OF PRESENT ILLNESS    Chief Complaint   Patient presents with    Diabetes    Hypertension    Follow-Up from [facility] - diverticulosis     Foot Pain     Neuropathy - hit left toe       Presents for follow-up  He is accompanied by his wife, Mary, who is undergoing radiation therapy for lung cancer palliatively.    Energy level is low.  He asks if anything could be wrong his blood work to explain this.    New onset anemia secondary to GI bleed.  He was admitted for diverticular bleed in early October.  Repeat hemoglobin October 12 showed hemoglobin 11.0.    Asked for a skin cancer check of his trunk.  No lesions reported of concern.    Left large toe at the tip there is an ulceration which has healed.    Hypertension -lisinopril was decreased last visit because of low blood pressure.  Hypertension ROS: taking medications as instructed, no medication side effects noted, no TIA's, no chest pain on exertion, no dyspnea on exertion, no swelling of ankles     reports that he has been smoking cigars. He has never used smokeless tobacco.    reports no history of alcohol use.   BP Readings from Last 2 Encounters:   01/10/24 138/72   10/12/23 120/72     Diabetes Mellitus follow-up  Hemoglobin A1C   Date Value Ref Range Status   10/12/2023 6.8 (H) 4.0 - 5.6 % Final     Comment:     (NOTE)  HbA1C Interpretive Ranges  <5.7              Normal  5.7 - 6.4         Consider Prediabetes  >6.5              Consider Diabetes       Hemoglobin A1C, POC   Date Value Ref Range Status   01/10/2024 7.1 % Final      medication compliance: compliant all of the time.    Diabetic diet compliance: compliant most of the time.   Home glucose monitoring: fasting values range none.     Hypoglycemic episodes:  None.    Further diabetic ROS: no polyuria or polydipsia, no chest pain, dyspnea or TIA's, no numbness, tingling or pain in extremities.       Wt Readings from Last 3 Encounters:   01/10/24 111.7 kg (246 lb

## 2024-01-11 LAB
ALBUMIN SERPL-MCNC: 3.9 G/DL (ref 3.5–5)
ALBUMIN/GLOB SERPL: 1.4 (ref 1.1–2.2)
ALP SERPL-CCNC: 32 U/L (ref 45–117)
ALT SERPL-CCNC: 32 U/L (ref 12–78)
ANION GAP SERPL CALC-SCNC: 5 MMOL/L (ref 5–15)
AST SERPL-CCNC: 22 U/L (ref 15–37)
BILIRUB SERPL-MCNC: 0.4 MG/DL (ref 0.2–1)
BUN SERPL-MCNC: 19 MG/DL (ref 6–20)
BUN/CREAT SERPL: 17 (ref 12–20)
CALCIUM SERPL-MCNC: 9.1 MG/DL (ref 8.5–10.1)
CHLORIDE SERPL-SCNC: 107 MMOL/L (ref 97–108)
CO2 SERPL-SCNC: 27 MMOL/L (ref 21–32)
CREAT SERPL-MCNC: 1.11 MG/DL (ref 0.7–1.3)
ERYTHROCYTE [DISTWIDTH] IN BLOOD BY AUTOMATED COUNT: 12.5 % (ref 11.5–14.5)
FERRITIN SERPL-MCNC: 6 NG/ML (ref 26–388)
GLOBULIN SER CALC-MCNC: 2.7 G/DL (ref 2–4)
GLUCOSE SERPL-MCNC: 162 MG/DL (ref 65–100)
HCT VFR BLD AUTO: 30.1 % (ref 36.6–50.3)
HGB BLD-MCNC: 9.9 G/DL (ref 12.1–17)
IRON SATN MFR SERPL: 6 % (ref 20–50)
IRON SERPL-MCNC: 26 UG/DL (ref 35–150)
MCH RBC QN AUTO: 28.5 PG (ref 26–34)
MCHC RBC AUTO-ENTMCNC: 32.9 G/DL (ref 30–36.5)
MCV RBC AUTO: 86.7 FL (ref 80–99)
NRBC # BLD: 0 K/UL (ref 0–0.01)
NRBC BLD-RTO: 0 PER 100 WBC
PLATELET # BLD AUTO: 223 K/UL (ref 150–400)
PMV BLD AUTO: 9.8 FL (ref 8.9–12.9)
POTASSIUM SERPL-SCNC: 4.4 MMOL/L (ref 3.5–5.1)
PROT SERPL-MCNC: 6.6 G/DL (ref 6.4–8.2)
RBC # BLD AUTO: 3.47 M/UL (ref 4.1–5.7)
SODIUM SERPL-SCNC: 139 MMOL/L (ref 136–145)
TIBC SERPL-MCNC: 448 UG/DL (ref 250–450)
TSH SERPL DL<=0.05 MIU/L-ACNC: 0.92 UIU/ML (ref 0.36–3.74)
WBC # BLD AUTO: 4.9 K/UL (ref 4.1–11.1)

## 2024-01-20 RX ORDER — FERROUS SULFATE 325(65) MG
325 TABLET ORAL 2 TIMES DAILY
Qty: 180 TABLET | Refills: 0 | Status: SHIPPED | OUTPATIENT
Start: 2024-01-20

## 2024-01-22 ENCOUNTER — TELEPHONE (OUTPATIENT)
Age: 79
End: 2024-01-22

## 2024-01-22 NOTE — TELEPHONE ENCOUNTER
Spoke with patient and updated on Dr. Gannon's comments, recommendations and script sent to his pharmacy for iro sulfate twice daily for Anemia. He ask if the blood draw can wait til his appt on 4/10/23 or if he needs to come to labs in March to have the repeat labs and can he put them in the system. He states understanding and grateful for the call.

## 2024-01-22 NOTE — TELEPHONE ENCOUNTER
If he is feeling overall better day by day, then the labs can wait until April, but if any concerns or not feeling better or if he has any visible blood in his stool, he is to have it done sooner.  Advised that iron can cause dark stools.

## 2024-01-22 NOTE — TELEPHONE ENCOUNTER
Spoke with patient and updated on Dr. Gannon's comments and recommendations regarding labs. He states understanding and grateful for the call.

## 2024-01-22 NOTE — TELEPHONE ENCOUNTER
----- Message from Will Gannon MD sent at 1/20/2024  8:38 AM EST -----  Eda,   Please let patient know that his iron levels are very low and he is moderately anemic.   This is  cause of his fatigue. Hx GI bleed, Currently asymptomatic. Colonoscopy 1/2023.    I sent in iron sulfate twice daily.  Repeat lab 2 months.

## 2024-02-22 ENCOUNTER — TELEPHONE (OUTPATIENT)
Age: 79
End: 2024-02-22

## 2024-02-22 NOTE — TELEPHONE ENCOUNTER
Patient came in office concerned for his knees patient will try ortho on call. If this is not resolved patient will call office for a referral if possible.Patient was thankful for the face to face with nurse.

## 2024-04-10 ENCOUNTER — OFFICE VISIT (OUTPATIENT)
Age: 79
End: 2024-04-10
Payer: MEDICARE

## 2024-04-10 VITALS
DIASTOLIC BLOOD PRESSURE: 80 MMHG | SYSTOLIC BLOOD PRESSURE: 135 MMHG | BODY MASS INDEX: 30.49 KG/M2 | WEIGHT: 237.6 LBS | RESPIRATION RATE: 18 BRPM | HEART RATE: 71 BPM | HEIGHT: 74 IN | TEMPERATURE: 98 F | OXYGEN SATURATION: 97 %

## 2024-04-10 DIAGNOSIS — R41.82 ALTERED MENTAL STATUS, UNSPECIFIED ALTERED MENTAL STATUS TYPE: ICD-10-CM

## 2024-04-10 DIAGNOSIS — R41.89 COGNITIVE CHANGE: ICD-10-CM

## 2024-04-10 DIAGNOSIS — Z95.0 PACEMAKER: ICD-10-CM

## 2024-04-10 DIAGNOSIS — I10 BENIGN ESSENTIAL HTN: ICD-10-CM

## 2024-04-10 DIAGNOSIS — M76.892 HAMSTRING TENDONITIS OF LEFT THIGH: ICD-10-CM

## 2024-04-10 DIAGNOSIS — D50.0 ANEMIA, BLOOD LOSS: Primary | ICD-10-CM

## 2024-04-10 DIAGNOSIS — Z87.19 HISTORY OF GI DIVERTICULAR BLEED: ICD-10-CM

## 2024-04-10 DIAGNOSIS — R68.82 DECREASED LIBIDO: ICD-10-CM

## 2024-04-10 DIAGNOSIS — M17.12 ARTHRITIS OF LEFT KNEE: ICD-10-CM

## 2024-04-10 DIAGNOSIS — E11.42 DM TYPE 2 WITH DIABETIC PERIPHERAL NEUROPATHY (HCC): ICD-10-CM

## 2024-04-10 PROBLEM — E66.9 OBESITY, UNSPECIFIED: Status: ACTIVE | Noted: 2024-04-10

## 2024-04-10 PROBLEM — L60.3 NAIL DYSTROPHY: Status: ACTIVE | Noted: 2024-04-10

## 2024-04-10 PROBLEM — B35.1 TINEA UNGUIUM: Status: ACTIVE | Noted: 2024-04-10

## 2024-04-10 LAB — HBA1C MFR BLD: 7.6 %

## 2024-04-10 PROCEDURE — 99215 OFFICE O/P EST HI 40 MIN: CPT | Performed by: INTERNAL MEDICINE

## 2024-04-10 PROCEDURE — 83036 HEMOGLOBIN GLYCOSYLATED A1C: CPT | Performed by: INTERNAL MEDICINE

## 2024-04-10 NOTE — PROGRESS NOTES
\"Have you been to the ER, urgent care clinic since your last visit?  Hospitalized since your last visit?\"    YES - When: approximately 3 months ago.  Where and Why: Not sure when or what facility but was seen for an issue with left foot.    “Have you seen or consulted any other health care providers outside of [facility] since your last visit?”    NO            Click Here for Release of Records Request

## 2024-04-10 NOTE — PROGRESS NOTES
HISTORY OF PRESENT ILLNESS    Chief Complaint   Patient presents with    Hypertension    Diabetes    Stress     States wife may not make it through the week - on hospice       Presents for follow-up  He is not doing very well today.  Sadly, his wife, Mary, is in hospice and may die this week of complications of lung cancer and stroke.  He is currently considering his options when she passes away.  His daughter has a lot of property and is building a new home on it that he may moved to.  He is also considering moving out of town to live with family.    New onset anemia secondary to GI bleed 10/2023.    He was admitted for diverticular bleed in early October 2023..    Repeat hemoglobin October 12 showed hemoglobin 11.0.  Repeat hgb 9.9, ferritin 6 1/10/24,   Taking ferrous sulfate 325 mg twice a day since that time.    Reports imbalance, memory loss.  Feels more forgetful.  He is somewhat repetitive during visit today.  Has not had any significant evaluation for this.  It has been mild but he feels it is progressing some.  Denies any significant headache or injuries.  He was in the  and served in [place].    Low libido.  Significant ED. he is not currently sexually active, but complains of the symptoms.     Hypertension  Hypertension ROS: taking medications as instructed, no medication side effects noted, no TIA's, no chest pain on exertion, no dyspnea on exertion, no swelling of ankles     reports that he has been smoking cigars. He has never used smokeless tobacco.    reports no history of alcohol use.   BP Readings from Last 2 Encounters:   04/10/24 135/80   01/10/24 138/72     Diabetes Mellitus follow-up  A1c 7.1 % 1/10/24  Hemoglobin A1C, POC   Date Value Ref Range Status   04/10/2024 7.6 % Final    medication compliance: compliant all of the time.    Diabetic diet compliance: compliant most of the time.   Home glucose monitoring: fasting values range none.     Hypoglycemic episodes:  None.    Further

## 2024-04-11 LAB
ANION GAP SERPL CALC-SCNC: 7 MMOL/L (ref 5–15)
BUN SERPL-MCNC: 20 MG/DL (ref 6–20)
BUN/CREAT SERPL: 20 (ref 12–20)
CALCIUM SERPL-MCNC: 9.7 MG/DL (ref 8.5–10.1)
CHLORIDE SERPL-SCNC: 102 MMOL/L (ref 97–108)
CO2 SERPL-SCNC: 26 MMOL/L (ref 21–32)
CREAT SERPL-MCNC: 0.99 MG/DL (ref 0.7–1.3)
ERYTHROCYTE [DISTWIDTH] IN BLOOD BY AUTOMATED COUNT: 15.2 % (ref 11.5–14.5)
FERRITIN SERPL-MCNC: 23 NG/ML (ref 26–388)
GLUCOSE SERPL-MCNC: 303 MG/DL (ref 65–100)
HCT VFR BLD AUTO: 42.1 % (ref 36.6–50.3)
HGB BLD-MCNC: 13.9 G/DL (ref 12.1–17)
MCH RBC QN AUTO: 29.5 PG (ref 26–34)
MCHC RBC AUTO-ENTMCNC: 33 G/DL (ref 30–36.5)
MCV RBC AUTO: 89.4 FL (ref 80–99)
NRBC # BLD: 0 K/UL (ref 0–0.01)
NRBC BLD-RTO: 0 PER 100 WBC
PLATELET # BLD AUTO: 185 K/UL (ref 150–400)
PMV BLD AUTO: 10.3 FL (ref 8.9–12.9)
POTASSIUM SERPL-SCNC: 4.5 MMOL/L (ref 3.5–5.1)
RBC # BLD AUTO: 4.71 M/UL (ref 4.1–5.7)
RPR SER QL: NONREACTIVE
SODIUM SERPL-SCNC: 135 MMOL/L (ref 136–145)
VIT B12 SERPL-MCNC: 359 PG/ML (ref 193–986)
WBC # BLD AUTO: 5.8 K/UL (ref 4.1–11.1)

## 2024-04-12 LAB — TESTOST SERPL-MCNC: 380 NG/DL (ref 264–916)

## 2024-04-18 DIAGNOSIS — D50.0 ANEMIA, BLOOD LOSS: Primary | ICD-10-CM

## 2024-04-18 RX ORDER — FERROUS SULFATE 325(65) MG
1 TABLET ORAL 2 TIMES DAILY
Qty: 180 TABLET | Refills: 0 | Status: SHIPPED | OUTPATIENT
Start: 2024-04-18

## 2024-04-26 DIAGNOSIS — E11.42 TYPE 2 DIABETES MELLITUS WITH DIABETIC POLYNEUROPATHY, UNSPECIFIED WHETHER LONG TERM INSULIN USE (HCC): ICD-10-CM

## 2024-05-08 DIAGNOSIS — I10 BENIGN ESSENTIAL HTN: ICD-10-CM

## 2024-05-08 RX ORDER — LISINOPRIL 10 MG/1
10 TABLET ORAL DAILY
Qty: 90 TABLET | Refills: 1 | Status: SHIPPED | OUTPATIENT
Start: 2024-05-08

## 2024-06-15 ENCOUNTER — HOSPITAL ENCOUNTER (EMERGENCY)
Facility: HOSPITAL | Age: 79
Discharge: HOME | End: 2024-06-15
Attending: EMERGENCY MEDICINE
Payer: MEDICARE

## 2024-06-15 VITALS
TEMPERATURE: 98.1 F | OXYGEN SATURATION: 95 % | DIASTOLIC BLOOD PRESSURE: 70 MMHG | WEIGHT: 242 LBS | BODY MASS INDEX: 31.06 KG/M2 | HEART RATE: 85 BPM | HEIGHT: 74 IN | RESPIRATION RATE: 17 BRPM | SYSTOLIC BLOOD PRESSURE: 139 MMHG

## 2024-06-15 DIAGNOSIS — L03.116 CELLULITIS OF LEFT LEG: Primary | ICD-10-CM

## 2024-06-15 PROCEDURE — 99283 EMERGENCY DEPT VISIT LOW MDM: CPT

## 2024-06-15 RX ORDER — DOXYCYCLINE HYCLATE 100 MG
100 TABLET ORAL 2 TIMES DAILY
Qty: 14 TABLET | Refills: 0 | Status: SHIPPED | OUTPATIENT
Start: 2024-06-15 | End: 2024-06-22

## 2024-06-15 NOTE — DISCHARGE INSTRUCTIONS
You are seen the emergency department today for a possible infected insect bite.  There is no way for us to confirm whether or not you had an actual tick bite or insect bite, but your symptoms are consistent with a developing skin infection.  We are treating you with oral antibiotics, but you can also use topical antibiotic cream such as Neosporin.  Your symptoms might worsen over the next day or 2 before the antibiotics kick in and make it look better.  If you develop any pus draining or worsening swelling and pain, please return to the ER for reevaluation.  You can also use warm compresses over the area.

## 2024-06-15 NOTE — ED PROVIDER NOTES
[facility] EMERGENCY DEPT  EMERGENCY DEPARTMENT ENCOUNTER      Pt Name: Wesly Barrientos  MRN: [id number]  Birthdate 1945  Date of evaluation: 6/15/2024  Provider: Юлия Bartlett DO    CHIEF COMPLAINT       Chief Complaint   Patient presents with    Tick Removal         HISTORY OF PRESENT ILLNESS   (Location/Symptom, Timing/Onset, Context/Setting, Quality, Duration, Modifying Factors, Severity)  Note limiting factors.   HPI  Pt is a 79 yo M who presents to the ED for a possible infected tick bite on his L thigh    Review of External Medical Records:     Nursing Notes were reviewed.    REVIEW OF SYSTEMS    (2-9 systems for level 4, 10 or more for level 5)     Review of Systems    Except as noted above the remainder of the review of systems was reviewed and negative.       PAST MEDICAL HISTORY     Past Medical History:   Diagnosis Date    Arthritis     Benign essential HTN     Cataract     Diabetes mellitus with microalbuminuria (HCC)     Diverticular hemorrhage 09/28/2023    DM type 2 with diabetic peripheral neuropathy (HCC)     ED (erectile dysfunction)     History of trauma     served in [place]    Hyponatremia     Na 128 7/2019, Na 131 5/7/20    Imbalance     due to neuropathy of legs, feet.  has cane since 2020 from [facility]    Large fiber neuropathy 10/2020    Dr. Mcclain.  large fiber sensorimotor polyneuropathy.  dx EMG.      Polyneuropathy     Dr. Guzman.  hands, feet, balance.   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 Olsen.  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 Olsen.      CATARACT REMOVAL Right 2015    CATARACT REMOVAL Left 2003    COLONOSCOPY  10/10/2017    two 5mm polyps..  repeat 5 years  Dr. Noriega    COLONOSCOPY  01/03/2023  Relation Age of Onset    Cancer Mother 73        myeloma    Leukemia Father 62          SOCIAL HISTORY       Social History     Socioeconomic History    Marital status:      Spouse name: Mary    Number of children: None    Years of education: None    Highest education level: None   Tobacco Use    Smoking status: Every Day     Types: Cigars     Passive exposure: Current    Smokeless tobacco: Never   Substance and Sexual Activity    Alcohol use: Not Currently   Social History Narrative    Son age 44 w no children,   fior in [place], age 40, 3 grandkids     Social Determinants of Health     Financial Resource Strain: Low Risk  (10/12/2023)    Overall Financial Resource Strain (CARDIA)     Difficulty of Paying Living Expenses: Not hard at all   Transportation Needs: Unknown (10/12/2023)    PRAPARE - Transportation     Lack of Transportation (Non-Medical): No   Physical Activity: Sufficiently Active (10/12/2023)    Exercise Vital Sign     Days of Exercise per Week: 5 days     Minutes of Exercise per Session: 30 min   Housing Stability: Unknown (10/12/2023)    Housing Stability Vital Sign     Unstable Housing in the Last Year: No           PHYSICAL EXAM    (up to 7 for level 4, 8 or more for level 5)     ED Triage Vitals [06/15/24 0944]   BP Temp Temp Source Pulse Respirations SpO2 Height Weight - Scale   (!) 178/87 98.1 °F (36.7 °C) Oral 85 17 96 % 1.88 m (6' 2\") 109.8 kg (242 lb)       Body mass index is 31.07 kg/m².    Physical Exam  Vitals and nursing note reviewed.   Constitutional:       General: He is not in acute distress.     Appearance: Normal appearance. He is normal weight.   HENT:      Head: Normocephalic and atraumatic.   Eyes:      Extraocular Movements: Extraocular movements intact.      Conjunctiva/sclera: Conjunctivae normal.   Pulmonary:      Effort: Pulmonary effort is normal.   Musculoskeletal:         General: No deformity or signs of injury. Normal range of motion.      Cervical back: Normal

## 2024-06-15 NOTE — ED TRIAGE NOTES
Pt presents to ED with c/o tick bite to left medial thigh two days ago. Pt is unsure if the tick has been completely removed. Pt denies fever or other symptoms.

## 2024-06-16 DIAGNOSIS — E11.29 TYPE 2 DIABETES MELLITUS WITH OTHER DIABETIC KIDNEY COMPLICATION (HCC): ICD-10-CM

## 2024-06-16 DIAGNOSIS — E78.5 HYPERLIPIDEMIA, UNSPECIFIED: ICD-10-CM

## 2024-06-17 RX ORDER — SIMVASTATIN 20 MG
TABLET ORAL
Qty: 90 TABLET | Refills: 2 | Status: SHIPPED | OUTPATIENT
Start: 2024-06-17

## 2024-06-17 RX ORDER — GLIPIZIDE 5 MG/1
5 TABLET ORAL 2 TIMES DAILY
Qty: 180 TABLET | Refills: 2 | Status: SHIPPED | OUTPATIENT
Start: 2024-06-17

## 2024-07-25 DIAGNOSIS — D50.0 ANEMIA, BLOOD LOSS: ICD-10-CM

## 2024-07-25 RX ORDER — FERROUS SULFATE 325(65) MG
1 TABLET ORAL 2 TIMES DAILY
Qty: 180 TABLET | Refills: 0 | Status: SHIPPED | OUTPATIENT
Start: 2024-07-25

## 2024-07-25 NOTE — TELEPHONE ENCOUNTER
PCP: Will Gannon MD    Last appt: 4/10/2024   Future Appointments   Date Time Provider Department Center   8/9/2024 10:40 AM Will Gannon MD [facility] BS AMB       Requested Prescriptions     Pending Prescriptions Disp Refills    ferrous sulfate (IRON 325) 325 (65 Fe) MG tablet [Pharmacy Med Name: FERROUS SULFATE 325 MG TABLET] 180 tablet 0     Sig: TAKE 1 TABLET BY MOUTH TWICE A DAY     Refill request received from [facility] to [facility] on [facility] for ferrous sulfate 325mg tab, 1 tab BID, 180 tabs with 0 refills. Patients last ferritin lab 4/10/2024: 23  Last Iron and TIBC 1/10/24: Iron 26 TIBC 448    Barbie \"[place]\" BOBO Nichols

## 2024-08-09 ENCOUNTER — OFFICE VISIT (OUTPATIENT)
Age: 79
End: 2024-08-09
Payer: MEDICARE

## 2024-08-09 VITALS
HEART RATE: 69 BPM | BODY MASS INDEX: 31.67 KG/M2 | RESPIRATION RATE: 19 BRPM | OXYGEN SATURATION: 95 % | SYSTOLIC BLOOD PRESSURE: 138 MMHG | WEIGHT: 246.8 LBS | HEIGHT: 74 IN | DIASTOLIC BLOOD PRESSURE: 82 MMHG | TEMPERATURE: 97.7 F

## 2024-08-09 DIAGNOSIS — E87.1 HYPONATREMIA: ICD-10-CM

## 2024-08-09 DIAGNOSIS — E11.42 DM TYPE 2 WITH DIABETIC PERIPHERAL NEUROPATHY (HCC): Primary | ICD-10-CM

## 2024-08-09 DIAGNOSIS — R41.89 COGNITIVE CHANGE: ICD-10-CM

## 2024-08-09 DIAGNOSIS — I10 BENIGN ESSENTIAL HTN: ICD-10-CM

## 2024-08-09 DIAGNOSIS — D50.9 IRON DEFICIENCY ANEMIA, UNSPECIFIED IRON DEFICIENCY ANEMIA TYPE: ICD-10-CM

## 2024-08-09 DIAGNOSIS — R46.81 OBSESSIVE BEHAVIOR: ICD-10-CM

## 2024-08-09 DIAGNOSIS — I47.10 SVT (SUPRAVENTRICULAR TACHYCARDIA) (HCC): ICD-10-CM

## 2024-08-09 LAB — HBA1C MFR BLD: 8.4 %

## 2024-08-09 PROCEDURE — 99214 OFFICE O/P EST MOD 30 MIN: CPT | Performed by: INTERNAL MEDICINE

## 2024-08-09 PROCEDURE — G8427 DOCREV CUR MEDS BY ELIG CLIN: HCPCS | Performed by: INTERNAL MEDICINE

## 2024-08-09 PROCEDURE — 3079F DIAST BP 80-89 MM HG: CPT | Performed by: INTERNAL MEDICINE

## 2024-08-09 PROCEDURE — 1123F ACP DISCUSS/DSCN MKR DOCD: CPT | Performed by: INTERNAL MEDICINE

## 2024-08-09 PROCEDURE — G8417 CALC BMI ABV UP PARAM F/U: HCPCS | Performed by: INTERNAL MEDICINE

## 2024-08-09 PROCEDURE — 3075F SYST BP GE 130 - 139MM HG: CPT | Performed by: INTERNAL MEDICINE

## 2024-08-09 PROCEDURE — 4004F PT TOBACCO SCREEN RCVD TLK: CPT | Performed by: INTERNAL MEDICINE

## 2024-08-09 PROCEDURE — 83036 HEMOGLOBIN GLYCOSYLATED A1C: CPT | Performed by: INTERNAL MEDICINE

## 2024-08-09 PROCEDURE — PBSHW AMB POC HEMOGLOBIN A1C: Performed by: INTERNAL MEDICINE

## 2024-08-09 RX ORDER — LISINOPRIL 20 MG/1
20 TABLET ORAL DAILY
Qty: 90 TABLET | Refills: 1 | Status: SHIPPED | OUTPATIENT
Start: 2024-08-09

## 2024-08-09 NOTE — PROGRESS NOTES
HISTORY OF PRESENT ILLNESS    Chief Complaint   Patient presents with    Hypertension    Diabetes     4 mo f/up       Presents for follow-up  Sadly, his wife, Mary passed away of lung cancer 4/12/24.  He says it has been difficult time.  He does not plan to move from his home.  Daughter is supportive, lives locally on a large property in [place].     He is no longer going to the [facility] and asks if he should keep applying for increasing benefits.  Discussed how this is likely a long, drawnout process and I do not think is worth his stress if he can make finances work.    Hypertension  Hypertension ROS: taking medications as instructed, no medication side effects noted, no TIA's, no chest pain on exertion, no dyspnea on exertion, no swelling of ankles     reports that he has been smoking cigars. He has been exposed to tobacco smoke. He has never used smokeless tobacco.    reports that he does not currently use alcohol.   BP Readings from Last 2 Encounters:   08/09/24 138/82   06/15/24 139/70     Diabetes Mellitus follow-up  A1c 8.4% today  Hemoglobin A1C, POC   Date Value Ref Range Status   04/10/2024 7.6 % Final    medication compliance: NOT compliant all of the time.    Taking metformin and glipizide, but admits he was NOT taking 1 med  because he ran out and resumed 3 weeks ago  Diabetic diet compliance: compliant most of the time.   Home glucose monitoring: fasting values range none.     Hypoglycemic episodes:  None.    Further diabetic ROS: no polyuria or polydipsia, no chest pain, dyspnea or TIA's, no numbness, tingling or pain in extremities.      Hx new onset anemia secondary to GI bleed 10/2023.    He was admitted for diverticular bleed in early October 2023..   Repeat hgb 9.9, ferritin 6 1/10/24.    Taking ferrous sulfate 325 mg twice a day  Hgb 13.9, ferritin 23 4/10/24.      Review of Systems   All other systems reviewed and are negative, except as noted in HPI    Past Medical and Surgical

## 2024-08-09 NOTE — PROGRESS NOTES
\"Have you been to the ER, urgent care clinic since your last visit?  Hospitalized since your last visit?\"    NO    “Have you seen or consulted any other health care providers outside of [facility] since your last visit?”    NO            Click Here for Release of Records Request

## 2024-08-10 PROBLEM — R41.89 COGNITIVE CHANGE: Status: ACTIVE | Noted: 2024-08-10

## 2024-08-10 PROBLEM — R46.81 OBSESSIVE BEHAVIOR: Status: ACTIVE | Noted: 2024-08-10

## 2024-10-07 ENCOUNTER — TELEPHONE (OUTPATIENT)
Age: 79
End: 2024-10-07

## 2024-10-07 NOTE — TELEPHONE ENCOUNTER
Spoke with patient and he reports his blood sugars are high today it was 12:35  before eating.  He reports frequent urination at night. He denies and difficulty with urination. He is concerned his diabetes is not under control. He admits to not eating the best diet since his wife passed several months ago. He is asking what he should do. Dr. Gannon recommends doubling his glipizide. Advised patient to continue to monitor and be sure to take his blood sugar in the AM prior to eating for a fasting blood sugar. Advised to take at night prior to going to bed. Advised patient that he may need to schedule an appt with Shelley Albarado our Pharmacist. He states he will ask Dr. Gannon at his appt. Grateful for the call.

## 2024-10-11 ENCOUNTER — OFFICE VISIT (OUTPATIENT)
Age: 79
End: 2024-10-11
Payer: MEDICARE

## 2024-10-11 VITALS
HEART RATE: 69 BPM | OXYGEN SATURATION: 97 % | DIASTOLIC BLOOD PRESSURE: 76 MMHG | HEIGHT: 74 IN | RESPIRATION RATE: 16 BRPM | BODY MASS INDEX: 31.32 KG/M2 | TEMPERATURE: 98.1 F | WEIGHT: 244 LBS | SYSTOLIC BLOOD PRESSURE: 134 MMHG

## 2024-10-11 DIAGNOSIS — E87.1 HYPONATREMIA: ICD-10-CM

## 2024-10-11 DIAGNOSIS — E11.29 TYPE 2 DIABETES MELLITUS WITH OTHER DIABETIC KIDNEY COMPLICATION (HCC): Primary | ICD-10-CM

## 2024-10-11 DIAGNOSIS — M17.10 KNEE ARTHROPATHY: ICD-10-CM

## 2024-10-11 DIAGNOSIS — G62.2 POLYNEUROPATHY DUE TO OTHER TOXIC AGENTS (HCC): ICD-10-CM

## 2024-10-11 DIAGNOSIS — I10 BENIGN ESSENTIAL HTN: ICD-10-CM

## 2024-10-11 DIAGNOSIS — D50.9 IRON DEFICIENCY ANEMIA, UNSPECIFIED IRON DEFICIENCY ANEMIA TYPE: ICD-10-CM

## 2024-10-11 DIAGNOSIS — R26.89 IMPAIRED GAIT AND MOBILITY: ICD-10-CM

## 2024-10-11 PROCEDURE — 1123F ACP DISCUSS/DSCN MKR DOCD: CPT | Performed by: INTERNAL MEDICINE

## 2024-10-11 PROCEDURE — 3078F DIAST BP <80 MM HG: CPT | Performed by: INTERNAL MEDICINE

## 2024-10-11 PROCEDURE — G8427 DOCREV CUR MEDS BY ELIG CLIN: HCPCS | Performed by: INTERNAL MEDICINE

## 2024-10-11 PROCEDURE — 3075F SYST BP GE 130 - 139MM HG: CPT | Performed by: INTERNAL MEDICINE

## 2024-10-11 PROCEDURE — 4004F PT TOBACCO SCREEN RCVD TLK: CPT | Performed by: INTERNAL MEDICINE

## 2024-10-11 PROCEDURE — 99213 OFFICE O/P EST LOW 20 MIN: CPT | Performed by: INTERNAL MEDICINE

## 2024-10-11 PROCEDURE — G8417 CALC BMI ABV UP PARAM F/U: HCPCS | Performed by: INTERNAL MEDICINE

## 2024-10-11 PROCEDURE — G8484 FLU IMMUNIZE NO ADMIN: HCPCS | Performed by: INTERNAL MEDICINE

## 2024-10-11 RX ORDER — GLIPIZIDE 10 MG/1
10 TABLET ORAL 2 TIMES DAILY
Qty: 180 TABLET | Refills: 0
Start: 2024-10-11

## 2024-10-11 NOTE — PROGRESS NOTES
HISTORY OF PRESENT ILLNESS    Chief Complaint   Patient presents with    Diabetes     Elevated glucose levels in the last 3 weeks ranging from 340's-375's mg/dL (no fasting glucose). Patient states he has been trying to eat the right food with the right amount since then, his numbers have gone down to 130's mg/dL. Patient also admits been under a lot of stress since his wife passed away.        Presents for follow-up  He continues to struggle somewhat with managing his affairs because of cognitive impairment.  He receives help from his daughter in [place]    Uncontrolled DM  He has been concerned about significantly uncontrolled diabetes.  He improved diet.  Increased to 7000-10,000 steps per day    Noted polyuria and noted glucose was was 345 10/7/24.  Doubled glipizide to 10 mg BID 3 days ago  Glucoses is 143 today.     Hemoglobin A1C   Date Value Ref Range Status   10/12/2023 6.8 (H) 4.0 - 5.6 % Final     Comment:     (NOTE)  HbA1C Interpretive Ranges  <5.7              Normal  5.7 - 6.4         Consider Prediabetes  >6.5              Consider Diabetes       Review of Systems   All other systems reviewed and are negative, except as noted in HPI    Past Medical and Surgical History   has a past medical history of Arthritis, Benign essential HTN, Cataract, Diabetes mellitus with microalbuminuria (HCC), Diverticular hemorrhage, DM type 2 with diabetic peripheral neuropathy (HCC), ED (erectile dysfunction), History of trauma, Hyponatremia, Imbalance, Large fiber neuropathy, Pacemaker, Polyneuropathy, 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     has a past surgical history that includes Cataract removal (Right, 2015); Cataract removal (Left, 2003); Colonoscopy (10/10/2017); heent; ablation of dysrhythmic focus (06/15/2016); Colonoscopy (01/03/2023); and Pacemaker insertion (12/2023).     reports that he has been smoking

## 2024-10-12 LAB
ANION GAP SERPL CALC-SCNC: 5 MMOL/L (ref 2–12)
BUN SERPL-MCNC: 22 MG/DL (ref 6–20)
BUN/CREAT SERPL: 19 (ref 12–20)
CALCIUM SERPL-MCNC: 9.5 MG/DL (ref 8.5–10.1)
CHLORIDE SERPL-SCNC: 102 MMOL/L (ref 97–108)
CO2 SERPL-SCNC: 27 MMOL/L (ref 21–32)
CREAT SERPL-MCNC: 1.15 MG/DL (ref 0.7–1.3)
ERYTHROCYTE [DISTWIDTH] IN BLOOD BY AUTOMATED COUNT: 11.7 % (ref 11.5–14.5)
FERRITIN SERPL-MCNC: 105 NG/ML (ref 26–388)
GLUCOSE SERPL-MCNC: 253 MG/DL (ref 65–100)
HCT VFR BLD AUTO: 42.4 % (ref 36.6–50.3)
HGB BLD-MCNC: 14.1 G/DL (ref 12.1–17)
MCH RBC QN AUTO: 30.8 PG (ref 26–34)
MCHC RBC AUTO-ENTMCNC: 33.3 G/DL (ref 30–36.5)
MCV RBC AUTO: 92.6 FL (ref 80–99)
NRBC # BLD: 0 K/UL (ref 0–0.01)
NRBC BLD-RTO: 0 PER 100 WBC
PLATELET # BLD AUTO: 192 K/UL (ref 150–400)
PMV BLD AUTO: 9.8 FL (ref 8.9–12.9)
POTASSIUM SERPL-SCNC: 4.5 MMOL/L (ref 3.5–5.1)
RBC # BLD AUTO: 4.58 M/UL (ref 4.1–5.7)
SODIUM SERPL-SCNC: 134 MMOL/L (ref 136–145)
WBC # BLD AUTO: 5.4 K/UL (ref 4.1–11.1)

## 2024-10-14 ENCOUNTER — TELEPHONE (OUTPATIENT)
Age: 79
End: 2024-10-14

## 2024-10-14 NOTE — TELEPHONE ENCOUNTER
It needs a little bit more time to settle out.  He is to continue metformin 1000 mg twice a day.  We increased glipizide to a total of 10 mg twice daily.  He had 5 mg pills and I think was going to take two 5 mg pills twice daily until he ran out.  I have increased it to 10 mg pills twice daily for whenever he wants to start that.  Give it 2 weeks and let me know if his fasting blood sugars are still above 200    I have concerns about his ability to care for himself long-term since he is getting overwhelmed, tangential.  So, needs extra TLC.  He has children locally.

## 2024-10-14 NOTE — TELEPHONE ENCOUNTER
Spoke with patient and updated on Dr. Gannon's comments and recommendations regarding his diabetic medication.Reviewed with patient several times to insure he understands. Requested that he write down it down while I was on the phone with him. He wrote it down and states understanding. Reviewed his appointment coming up on 12/9/24 at 10:20 AM with Dr. Gannon. He states understanding and wrote it down. Grateful for the call.

## 2024-10-14 NOTE — TELEPHONE ENCOUNTER
PSR reached out to patient to reschedule appointment in December - when speaking with him he was saying his sugars were still in the 300's   Patient stated he took levels this morning at 7 AM and it was 308 and this afternoon around 2 PM it was 238, Tuesday he took it in the morning it was 250 then in three minutes it went down to 208     Patient is unsure if he needs to be seen sooner as he was just here on the 11th and doubled his medication

## 2024-10-19 DIAGNOSIS — E11.42 TYPE 2 DIABETES MELLITUS WITH DIABETIC POLYNEUROPATHY, UNSPECIFIED WHETHER LONG TERM INSULIN USE (HCC): ICD-10-CM

## 2024-10-22 DIAGNOSIS — D50.0 ANEMIA, BLOOD LOSS: ICD-10-CM

## 2024-10-22 RX ORDER — FERROUS SULFATE 325(65) MG
1 TABLET ORAL 2 TIMES DAILY
Qty: 180 TABLET | Refills: 1 | Status: SHIPPED | OUTPATIENT
Start: 2024-10-22

## 2024-11-06 ENCOUNTER — TELEPHONE (OUTPATIENT)
Age: 79
End: 2024-11-06

## 2024-11-06 NOTE — TELEPHONE ENCOUNTER
T/C to patients home and mobile numbers to update on Dr. Gannon's message regarding his blood sugars. No answer and LVM.

## 2024-11-08 ENCOUNTER — TELEPHONE (OUTPATIENT)
Age: 79
End: 2024-11-08

## 2024-11-08 NOTE — TELEPHONE ENCOUNTER
Spoke with Elida/Dtr (HIPPPA VERIFIED) regarding Dr. Gannon's comments regarding patients blood sugars. No answer and LVM

## 2024-11-08 NOTE — TELEPHONE ENCOUNTER
Spoke with Elida/Ivan (HIPPPA VERIFIED)updated on Dr. Gannon's comments regarding patients blood sugars. She states understanding and reports that patient is now currently living alone since spouse passed and that  her mother managed his health care for him and she has some concerns. She reports that patient is showing some signs of possible dementia and would like Dr. Gannon address that also at his upcoming appt. She will discuss with patient his diet , exercise and medications. Grateful for the call. Dr. Gannon notified.

## 2024-11-08 NOTE — TELEPHONE ENCOUNTER
Thank you.  Patient does have significant anxiety, PTSD and has been showing clear evidence of worsening cognitive function with probable dementia.  I think he does need more support now and I do not think he will be safe to manage his own affairs in the very near future.  He told me that his daughter was helping him and I am relieved to know that.

## 2024-12-09 ENCOUNTER — OFFICE VISIT (OUTPATIENT)
Facility: CLINIC | Age: 79
End: 2024-12-09
Payer: MEDICARE

## 2024-12-09 VITALS
RESPIRATION RATE: 18 BRPM | DIASTOLIC BLOOD PRESSURE: 73 MMHG | SYSTOLIC BLOOD PRESSURE: 137 MMHG | HEART RATE: 77 BPM | HEIGHT: 74 IN | WEIGHT: 251 LBS | OXYGEN SATURATION: 94 % | BODY MASS INDEX: 32.21 KG/M2 | TEMPERATURE: 97.4 F

## 2024-12-09 DIAGNOSIS — R26.89 IMPAIRED GAIT AND MOBILITY: Primary | ICD-10-CM

## 2024-12-09 DIAGNOSIS — E87.1 HYPONATREMIA: ICD-10-CM

## 2024-12-09 DIAGNOSIS — R41.89 COGNITIVE CHANGE: ICD-10-CM

## 2024-12-09 DIAGNOSIS — G62.2 POLYNEUROPATHY DUE TO OTHER TOXIC AGENTS (HCC): ICD-10-CM

## 2024-12-09 DIAGNOSIS — E11.42 DM TYPE 2 WITH DIABETIC PERIPHERAL NEUROPATHY (HCC): ICD-10-CM

## 2024-12-09 PROCEDURE — 4004F PT TOBACCO SCREEN RCVD TLK: CPT | Performed by: INTERNAL MEDICINE

## 2024-12-09 PROCEDURE — 99214 OFFICE O/P EST MOD 30 MIN: CPT | Performed by: INTERNAL MEDICINE

## 2024-12-09 PROCEDURE — G8427 DOCREV CUR MEDS BY ELIG CLIN: HCPCS | Performed by: INTERNAL MEDICINE

## 2024-12-09 PROCEDURE — 3078F DIAST BP <80 MM HG: CPT | Performed by: INTERNAL MEDICINE

## 2024-12-09 PROCEDURE — 3075F SYST BP GE 130 - 139MM HG: CPT | Performed by: INTERNAL MEDICINE

## 2024-12-09 PROCEDURE — G8484 FLU IMMUNIZE NO ADMIN: HCPCS | Performed by: INTERNAL MEDICINE

## 2024-12-09 PROCEDURE — 1159F MED LIST DOCD IN RCRD: CPT | Performed by: INTERNAL MEDICINE

## 2024-12-09 PROCEDURE — 1126F AMNT PAIN NOTED NONE PRSNT: CPT | Performed by: INTERNAL MEDICINE

## 2024-12-09 PROCEDURE — 1123F ACP DISCUSS/DSCN MKR DOCD: CPT | Performed by: INTERNAL MEDICINE

## 2024-12-09 PROCEDURE — G8417 CALC BMI ABV UP PARAM F/U: HCPCS | Performed by: INTERNAL MEDICINE

## 2024-12-09 NOTE — PROGRESS NOTES
HISTORY OF PRESENT ILLNESS    Chief Complaint   Patient presents with    Follow-up     4 month       Presents for follow-up    His is with his daughter Elida.  She lives 9 miles away him, and [place].    She is trying to build a small home on her property for him to live.  Getting proposals for this now.  Also considering assisted living.    [facility] and [facility] care.  Prefers not to have primary care or specialty care there otherwise.    Diabetes Mellitus follow-up  Hemoglobin A1C, POC   Date Value Ref Range Status   08/09/2024 8.4 % Final    medication compliance: compliant all of the time.  Increased glipizixde to 10 mg bid and is taking metofmrin  Diabetic diet compliance: IMPROVED/ compliant most of the time.   Home glucose monitoring: fasting values range 140-169.  But 237 2 days ago.  Hypoglycemic episodes:  None.    Further diabetic ROS: no polyuria or polydipsia, no chest pain, dyspnea or TIA's, no numbness, tingling or pain in extremities.     History of hyponatremia.  Chronic.  Typically 133-135 range.    Blood cells are normal.    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     has a past surgical history that includes Cataract removal (Right, 2015); Cataract removal (Left, 2003); Colonoscopy (10/10/2017); heent; ablation of dysrhythmic focus (06/15/2016); Colonoscopy (01/03/2023); and Pacemaker insertion (12/2023).

## 2024-12-09 NOTE — PROGRESS NOTES
Room:  I have reviewed all needed documentation in preparation for visit. Verified patient by name and date of birth  Chief Complaint   Patient presents with    Follow-up     4 month       Vitals:    12/09/24 1028   BP: 137/73   Pulse: 77   Resp: 18   Temp: 97.4 °F (36.3 °C)   TempSrc: Temporal   SpO2: 94%   Weight: 113.9 kg (251 lb)   Height: 1.88 m (6' 2\")        Health Maintenance Due   Topic Date Due    Diabetic Alb to Cr ratio (uACR) test  10/06/2023    Flu vaccine (1) 08/01/2024    COVID-19 Vaccine (6 - 2023-24 season) 09/01/2024    Lipids  10/12/2024    Annual Wellness Visit (Medicare)  10/12/2024        1. \"Have you been to the ER, urgent care clinic since your last visit?  Hospitalized since your last visit?\" No     2. \"Have you seen or consulted any other health care providers outside of the [facility] since your last visit?\"  No

## 2024-12-20 DIAGNOSIS — I10 BENIGN ESSENTIAL HTN: ICD-10-CM

## 2024-12-20 RX ORDER — LISINOPRIL 20 MG/1
20 TABLET ORAL DAILY
Qty: 90 TABLET | Refills: 1 | Status: SHIPPED | OUTPATIENT
Start: 2024-12-20

## 2024-12-27 DIAGNOSIS — E11.29 TYPE 2 DIABETES MELLITUS WITH OTHER DIABETIC KIDNEY COMPLICATION (HCC): ICD-10-CM

## 2024-12-27 RX ORDER — GLIPIZIDE 10 MG/1
10 TABLET ORAL 2 TIMES DAILY
Qty: 180 TABLET | Refills: 0 | Status: SHIPPED | OUTPATIENT
Start: 2024-12-27

## 2025-01-16 ENCOUNTER — HOSPITAL ENCOUNTER (OUTPATIENT)
Facility: HOSPITAL | Age: 80
Setting detail: RECURRING SERIES
Discharge: HOME | End: 2025-01-19
Payer: MEDICARE

## 2025-01-16 PROCEDURE — 97110 THERAPEUTIC EXERCISES: CPT | Performed by: PHYSICAL THERAPIST

## 2025-01-16 PROCEDURE — 97112 NEUROMUSCULAR REEDUCATION: CPT | Performed by: PHYSICAL THERAPIST

## 2025-01-16 PROCEDURE — 97162 PT EVAL MOD COMPLEX 30 MIN: CPT | Performed by: PHYSICAL THERAPIST

## 2025-01-16 NOTE — THERAPY EVALUATION
Physical Therapy at [facility],   a part of [address], [address]  [place] [address]  Phone: [phone]  Fax: [phone]       PHYSICAL THERAPY - MEDICARE EVALUATION/PLAN OF CARE NOTE (updated 3/23)      Date: 2025          Patient Name:  Wesly Barrientos :  1945   Medical   Diagnosis:  Bilateral knee pain [M25.561, M25.562]  Other abnormalities of gait and mobility [R26.89] Treatment Diagnosis:  M25.561  RIGHT KNEE PAIN and M25.562  LEFT KNEE PAIN    Referral Source:  Will Gannon MD Provider #:  [id number]                Insurance: Payor: MEDICARE / Plan: MEDICARE PART A AND B / Product Type: *No Product type* /      Patient  verified yes     Visit #   Current  / Total 1 24   Time   In / Out 2:30 PM 3:30 PM   Total Treatment Time 60   Total Timed Codes 40   1:1 Treatment Time 40    [facility] Totals Reminder:  bill using total billable   min of TIMED therapeutic procedures and modalities.   8-22 min = 1 unit; 23-37 min = 2 units; 38-52 min = 3 units;  53-67 min = 4 units; 68-82 min = 5 units           SUBJECTIVE  Pain Level (0-10 scale): stiff  []constant []intermittent []improving []worsening []no change since onset    Any medication changes, allergies to medications, adverse drug reactions, diagnosis change, or new procedure performed?: [x] No    [] Yes (see summary sheet for update)  Medications: Verified on Patient Summary List    Subjective functional status/changes:     B knee stiff, polyneuropathy, poor balance, increased fall risk    Start of Care: 2025  Onset Date: Gradual onset over the past 2 years  Current symptoms/Complaints: Unable to fully flex the R knee, dependent on SPC, poor balance  Mechanism of Injury: Gradual onset  PLOF: No regular exercise program, active with caring for the house  Limitations to PLOF/Activity or Recreational Limitations: none  Work Hx: Retired   Living Situation: Lives

## 2025-01-23 ENCOUNTER — TELEPHONE (OUTPATIENT)
Facility: CLINIC | Age: 80
End: 2025-01-23

## 2025-01-23 DIAGNOSIS — E78.5 HYPERLIPIDEMIA, UNSPECIFIED: ICD-10-CM

## 2025-01-23 DIAGNOSIS — E11.29 TYPE 2 DIABETES MELLITUS WITH OTHER DIABETIC KIDNEY COMPLICATION (HCC): ICD-10-CM

## 2025-01-23 DIAGNOSIS — E11.42 TYPE 2 DIABETES MELLITUS WITH DIABETIC POLYNEUROPATHY, UNSPECIFIED WHETHER LONG TERM INSULIN USE (HCC): ICD-10-CM

## 2025-01-23 DIAGNOSIS — D50.0 ANEMIA, BLOOD LOSS: ICD-10-CM

## 2025-01-23 DIAGNOSIS — I10 BENIGN ESSENTIAL HTN: ICD-10-CM

## 2025-01-23 RX ORDER — SIMVASTATIN 20 MG
20 TABLET ORAL EVERY EVENING
Qty: 90 TABLET | Refills: 2 | Status: SHIPPED | OUTPATIENT
Start: 2025-01-23

## 2025-01-23 RX ORDER — GLIPIZIDE 10 MG/1
10 TABLET ORAL 2 TIMES DAILY
Qty: 180 TABLET | Refills: 0 | Status: SHIPPED | OUTPATIENT
Start: 2025-01-23

## 2025-01-23 RX ORDER — LISINOPRIL 20 MG/1
20 TABLET ORAL DAILY
Qty: 90 TABLET | Refills: 1 | Status: SHIPPED | OUTPATIENT
Start: 2025-01-23

## 2025-01-23 RX ORDER — FERROUS SULFATE 325(65) MG
1 TABLET ORAL 2 TIMES DAILY
Qty: 180 TABLET | Refills: 1 | Status: SHIPPED | OUTPATIENT
Start: 2025-01-23

## 2025-01-23 NOTE — TELEPHONE ENCOUNTER
Medication Refill Request    Wesly Barrientos is requesting a refill of the following medication(s):   Metformin HCL  Glipizide   Ferrous Sulfate   Simvastatin  Lisinopril     I verified pt's pharmacy and his contact number.     Thank you     Please send refill to:     [facility]/pharmacy #[id number] - [place], [place] - [address] [facility] -  [phone] - F [phone] [address] [facility] [address]  Phone: [phone] Fax: [phone]

## 2025-01-29 ENCOUNTER — HOSPITAL ENCOUNTER (OUTPATIENT)
Facility: HOSPITAL | Age: 80
Setting detail: RECURRING SERIES
Discharge: HOME | End: 2025-02-01
Payer: MEDICARE

## 2025-01-29 PROCEDURE — 97112 NEUROMUSCULAR REEDUCATION: CPT | Performed by: PHYSICAL THERAPIST

## 2025-01-29 PROCEDURE — 97110 THERAPEUTIC EXERCISES: CPT | Performed by: PHYSICAL THERAPIST

## 2025-01-29 NOTE — PROGRESS NOTES
PHYSICAL THERAPY - MEDICARE DAILY TREATMENT NOTE (updated 3/23)      Date: 2025          Patient Name:  Wesly Barrientos :  1945   Medical   Diagnosis:  Bilateral knee pain [M25.561, M25.562]  Other abnormalities of gait and mobility [R26.89] Treatment Diagnosis:  M25.561  RIGHT KNEE PAIN and M25.562  LEFT KNEE PAIN  and R26.81   Unsteadiness on feet    Referral Source:  Will Gannon MD Insurance:   Payor: MEDICARE / Plan: MEDICARE PART A AND B / Product Type: *No Product type* /                     Patient  verified yes     Visit #   Current  / Total 2 24   Time   In / Out 11:45 AM 12:25 PM   Total Treatment Time 40   Total Timed Codes 40   1:1 Treatment Time 40      [facility] Totals Reminder:  bill using total billable   min of TIMED therapeutic procedures and modalities.   8-22 min = 1 unit; 23-37 min = 2 units; 38-52 min = 3 units; 53-67 min = 4 units; 68-82 min = 5 units            SUBJECTIVE    Pain Level (0-10 scale): 0    Any medication changes, allergies to medications, adverse drug reactions, diagnosis change, or new procedure performed?: [x] No    [] Yes (see summary sheet for update)  Medications: Verified on Patient Summary List    Subjective functional status/changes:     Patient has been performing his HEP daily and feels more comfortable performing the exercises.    OBJECTIVE      Therapeutic Procedures:  Tx Min Billable or 1:1 Min (if diff from Tx Min) Procedure, Rationale, Specifics   25   45768 Therapeutic Exercise (timed):  increase ROM, strength, coordination, balance, and proprioception to improve patient's ability to progress to PLOF and address remaining functional goals. (see flow sheet as applicable)     Details if applicable:     94 13252 Neuromuscular Re-Education (timed):  improve balance, coordination, kinesthetic sense, posture, core stability and proprioception to improve patient's ability to develop conscious control of individual muscles and awareness of position of

## 2025-02-05 ENCOUNTER — APPOINTMENT (OUTPATIENT)
Facility: HOSPITAL | Age: 80
End: 2025-02-05
Payer: MEDICARE

## 2025-02-06 ENCOUNTER — HOSPITAL ENCOUNTER (OUTPATIENT)
Facility: HOSPITAL | Age: 80
Setting detail: RECURRING SERIES
Discharge: HOME | End: 2025-02-09
Payer: MEDICARE

## 2025-02-06 PROCEDURE — 97112 NEUROMUSCULAR REEDUCATION: CPT | Performed by: PHYSICAL THERAPIST

## 2025-02-06 PROCEDURE — 97110 THERAPEUTIC EXERCISES: CPT | Performed by: PHYSICAL THERAPIST

## 2025-02-06 NOTE — PROGRESS NOTES
PHYSICAL THERAPY - MEDICARE DAILY TREATMENT NOTE (updated 3/23)      Date: 2025          Patient Name:  Wesly Barrientos :  1945   Medical   Diagnosis:  Bilateral knee pain [M25.561, M25.562]  Other abnormalities of gait and mobility [R26.89] Treatment Diagnosis:  M25.561  RIGHT KNEE PAIN and M25.562  LEFT KNEE PAIN  and R26.81   Unsteadiness on feet    Referral Source:  Will Gannon MD Insurance:   Payor: MEDICARE / Plan: MEDICARE PART A AND B / Product Type: *No Product type* /                     Patient  verified yes     Visit #   Current  / Total 3 24   Time   In / Out 11:15 AM 11:55 AM   Total Treatment Time 40   Total Timed Codes 40   1:1 Treatment Time 40      [facility] Totals Reminder:  bill using total billable   min of TIMED therapeutic procedures and modalities.   8-22 min = 1 unit; 23-37 min = 2 units; 38-52 min = 3 units; 53-67 min = 4 units; 68-82 min = 5 units            SUBJECTIVE    Pain Level (0-10 scale): 0    Any medication changes, allergies to medications, adverse drug reactions, diagnosis change, or new procedure performed?: [x] No    [] Yes (see summary sheet for update)  Medications: Verified on Patient Summary List    Subjective functional status/changes:     Patient is very fatigued today [facility]. \"I'll do what I can.\"    OBJECTIVE      Therapeutic Procedures:  Tx Min Billable or 1:1 Min (if diff from Tx Min) Procedure, Rationale, Specifics   25   55649 Therapeutic Exercise (timed):  increase ROM, strength, coordination, balance, and proprioception to improve patient's ability to progress to PLOF and address remaining functional goals. (see flow sheet as applicable)     Details if applicable:     15   93907 Neuromuscular Re-Education (timed):  improve balance, coordination, kinesthetic sense, posture, core stability and proprioception to improve patient's ability to develop conscious control of individual muscles and awareness of position of

## 2025-02-19 ENCOUNTER — APPOINTMENT (OUTPATIENT)
Facility: HOSPITAL | Age: 80
End: 2025-02-19
Payer: MEDICARE

## 2025-03-06 ENCOUNTER — HOSPITAL ENCOUNTER (OUTPATIENT)
Facility: HOSPITAL | Age: 80
Setting detail: RECURRING SERIES
Discharge: HOME | End: 2025-03-09
Payer: MEDICARE

## 2025-03-06 PROCEDURE — 97112 NEUROMUSCULAR REEDUCATION: CPT | Performed by: PHYSICAL THERAPIST

## 2025-03-06 PROCEDURE — 97110 THERAPEUTIC EXERCISES: CPT | Performed by: PHYSICAL THERAPIST

## 2025-03-06 NOTE — PROGRESS NOTES
PHYSICAL THERAPY - MEDICARE DAILY TREATMENT NOTE (updated 3/23)      Date: 3/6/2025          Patient Name:  Wesly Barrientos :  1945   Medical   Diagnosis:  Bilateral knee pain [M25.561, M25.562]  Other abnormalities of gait and mobility [R26.89] Treatment Diagnosis:  M25.561  RIGHT KNEE PAIN and M25.562  LEFT KNEE PAIN  and R26.81   Unsteadiness on feet    Referral Source:  Will Gannon MD Insurance:   Payor: MEDICARE / Plan: MEDICARE PART A AND B / Product Type: *No Product type* /                     Patient  verified yes     Visit #   Current  / Total 4 24   Time   In / Out 10:55 AM 11:35 AM   Total Treatment Time 40   Total Timed Codes 40   1:1 Treatment Time 40      [facility] Totals Reminder:  bill using total billable   min of TIMED therapeutic procedures and modalities.   8-22 min = 1 unit; 23-37 min = 2 units; 38-52 min = 3 units; 53-67 min = 4 units; 68-82 min = 5 units            SUBJECTIVE    Pain Level (0-10 scale): 0    Any medication changes, allergies to medications, adverse drug reactions, diagnosis change, or new procedure performed?: [x] No    [] Yes (see summary sheet for update)  Medications: Verified on Patient Summary List    Subjective functional status/changes:     Patient is feeling much better overall and will likely have this visit be his last.    OBJECTIVE      Therapeutic Procedures:  Tx Min Billable or 1:1 Min (if diff from Tx Min) Procedure, Rationale, Specifics   25   85214 Therapeutic Exercise (timed):  increase ROM, strength, coordination, balance, and proprioception to improve patient's ability to progress to PLOF and address remaining functional goals. (see flow sheet as applicable)     Details if applicable:     40 37843 Neuromuscular Re-Education (timed):  improve balance, coordination, kinesthetic sense, posture, core stability and proprioception to improve patient's ability to develop conscious control of individual muscles and awareness of position of extremities

## 2025-03-13 ENCOUNTER — TELEPHONE (OUTPATIENT)
Facility: CLINIC | Age: 80
End: 2025-03-13

## 2025-03-13 SDOH — HEALTH STABILITY: PHYSICAL HEALTH: ON AVERAGE, HOW MANY DAYS PER WEEK DO YOU ENGAGE IN MODERATE TO STRENUOUS EXERCISE (LIKE A BRISK WALK)?: 4 DAYS

## 2025-03-13 SDOH — HEALTH STABILITY: PHYSICAL HEALTH: ON AVERAGE, HOW MANY MINUTES DO YOU ENGAGE IN EXERCISE AT THIS LEVEL?: 30 MIN

## 2025-03-13 ASSESSMENT — LIFESTYLE VARIABLES
HOW MANY STANDARD DRINKS CONTAINING ALCOHOL DO YOU HAVE ON A TYPICAL DAY: 0
HOW MANY STANDARD DRINKS CONTAINING ALCOHOL DO YOU HAVE ON A TYPICAL DAY: PATIENT DOES NOT DRINK
HOW OFTEN DO YOU HAVE A DRINK CONTAINING ALCOHOL: 1
HOW OFTEN DO YOU HAVE SIX OR MORE DRINKS ON ONE OCCASION: 1
HOW OFTEN DO YOU HAVE A DRINK CONTAINING ALCOHOL: NEVER

## 2025-03-13 ASSESSMENT — PATIENT HEALTH QUESTIONNAIRE - PHQ9
SUM OF ALL RESPONSES TO PHQ QUESTIONS 1-9: 0
SUM OF ALL RESPONSES TO PHQ QUESTIONS 1-9: 0
1. LITTLE INTEREST OR PLEASURE IN DOING THINGS: NOT AT ALL
SUM OF ALL RESPONSES TO PHQ QUESTIONS 1-9: 0
SUM OF ALL RESPONSES TO PHQ QUESTIONS 1-9: 0
2. FEELING DOWN, DEPRESSED OR HOPELESS: NOT AT ALL

## 2025-03-17 ENCOUNTER — OFFICE VISIT (OUTPATIENT)
Facility: CLINIC | Age: 80
End: 2025-03-17
Payer: MEDICARE

## 2025-03-17 VITALS
DIASTOLIC BLOOD PRESSURE: 77 MMHG | TEMPERATURE: 97.4 F | WEIGHT: 254 LBS | RESPIRATION RATE: 15 BRPM | HEART RATE: 70 BPM | OXYGEN SATURATION: 95 % | SYSTOLIC BLOOD PRESSURE: 137 MMHG | HEIGHT: 74 IN | BODY MASS INDEX: 32.6 KG/M2

## 2025-03-17 DIAGNOSIS — E78.00 PURE HYPERCHOLESTEROLEMIA: ICD-10-CM

## 2025-03-17 DIAGNOSIS — D64.9 ANEMIA, UNSPECIFIED TYPE: ICD-10-CM

## 2025-03-17 DIAGNOSIS — D50.9 IRON DEFICIENCY ANEMIA, UNSPECIFIED IRON DEFICIENCY ANEMIA TYPE: ICD-10-CM

## 2025-03-17 DIAGNOSIS — E11.42 DM TYPE 2 WITH DIABETIC PERIPHERAL NEUROPATHY (HCC): ICD-10-CM

## 2025-03-17 DIAGNOSIS — I10 BENIGN ESSENTIAL HTN: ICD-10-CM

## 2025-03-17 DIAGNOSIS — Z00.00 MEDICARE ANNUAL WELLNESS VISIT, SUBSEQUENT: Primary | ICD-10-CM

## 2025-03-17 LAB
ALBUMIN SERPL-MCNC: 4 G/DL (ref 3.5–5)
ALBUMIN/GLOB SERPL: 1.3 (ref 1.1–2.2)
ALP SERPL-CCNC: 34 U/L (ref 45–117)
ALT SERPL-CCNC: 32 U/L (ref 12–78)
ANION GAP SERPL CALC-SCNC: 8 MMOL/L (ref 2–12)
APPEARANCE UR: CLEAR
AST SERPL-CCNC: 20 U/L (ref 15–37)
BACTERIA URNS QL MICRO: NEGATIVE /HPF
BILIRUB SERPL-MCNC: 0.6 MG/DL (ref 0.2–1)
BILIRUB UR QL: NEGATIVE
BUN SERPL-MCNC: 18 MG/DL (ref 6–20)
BUN/CREAT SERPL: 18 (ref 12–20)
CALCIUM SERPL-MCNC: 9.7 MG/DL (ref 8.5–10.1)
CHLORIDE SERPL-SCNC: 102 MMOL/L (ref 97–108)
CHOLEST SERPL-MCNC: 120 MG/DL
CO2 SERPL-SCNC: 24 MMOL/L (ref 21–32)
COLOR UR: ABNORMAL
CREAT SERPL-MCNC: 0.99 MG/DL (ref 0.7–1.3)
CREAT UR-MCNC: 37.4 MG/DL
EPITH CASTS URNS QL MICRO: ABNORMAL /LPF
ERYTHROCYTE [DISTWIDTH] IN BLOOD BY AUTOMATED COUNT: 11.6 % (ref 11.5–14.5)
EST. AVERAGE GLUCOSE BLD GHB EST-MCNC: 217 MG/DL
FERRITIN SERPL-MCNC: 116 NG/ML (ref 26–388)
GLOBULIN SER CALC-MCNC: 3 G/DL (ref 2–4)
GLUCOSE SERPL-MCNC: 299 MG/DL (ref 65–100)
GLUCOSE UR STRIP.AUTO-MCNC: >1000 MG/DL
HBA1C MFR BLD: 9.2 % (ref 4–5.6)
HCT VFR BLD AUTO: 41.5 % (ref 36.6–50.3)
HDLC SERPL-MCNC: 46 MG/DL
HDLC SERPL: 2.6 (ref 0–5)
HGB BLD-MCNC: 14.1 G/DL (ref 12.1–17)
HGB UR QL STRIP: NEGATIVE
HYALINE CASTS URNS QL MICRO: ABNORMAL /LPF (ref 0–5)
KETONES UR QL STRIP.AUTO: NEGATIVE MG/DL
LDLC SERPL CALC-MCNC: 52.4 MG/DL (ref 0–100)
LEUKOCYTE ESTERASE UR QL STRIP.AUTO: NEGATIVE
MCH RBC QN AUTO: 30.9 PG (ref 26–34)
MCHC RBC AUTO-ENTMCNC: 34 G/DL (ref 30–36.5)
MCV RBC AUTO: 91 FL (ref 80–99)
MICROALBUMIN UR-MCNC: 7.9 MG/DL
MICROALBUMIN/CREAT UR-RTO: 211 MG/G (ref 0–30)
NITRITE UR QL STRIP.AUTO: NEGATIVE
NRBC # BLD: 0 K/UL (ref 0–0.01)
NRBC BLD-RTO: 0 PER 100 WBC
PH UR STRIP: 6.5 (ref 5–8)
PLATELET # BLD AUTO: 187 K/UL (ref 150–400)
PMV BLD AUTO: 10.1 FL (ref 8.9–12.9)
POTASSIUM SERPL-SCNC: 4.5 MMOL/L (ref 3.5–5.1)
PROT SERPL-MCNC: 7 G/DL (ref 6.4–8.2)
PROT UR STRIP-MCNC: NEGATIVE MG/DL
RBC # BLD AUTO: 4.56 M/UL (ref 4.1–5.7)
RBC #/AREA URNS HPF: ABNORMAL /HPF (ref 0–5)
SODIUM SERPL-SCNC: 134 MMOL/L (ref 136–145)
SP GR UR REFRACTOMETRY: 1.01 (ref 1–1.03)
TRIGL SERPL-MCNC: 108 MG/DL
UROBILINOGEN UR QL STRIP.AUTO: 0.2 EU/DL (ref 0.2–1)
VLDLC SERPL CALC-MCNC: 21.6 MG/DL
WBC # BLD AUTO: 6.1 K/UL (ref 4.1–11.1)
WBC URNS QL MICRO: ABNORMAL /HPF (ref 0–4)

## 2025-03-17 PROCEDURE — G8427 DOCREV CUR MEDS BY ELIG CLIN: HCPCS | Performed by: INTERNAL MEDICINE

## 2025-03-17 PROCEDURE — G0439 PPPS, SUBSEQ VISIT: HCPCS | Performed by: INTERNAL MEDICINE

## 2025-03-17 PROCEDURE — 1126F AMNT PAIN NOTED NONE PRSNT: CPT | Performed by: INTERNAL MEDICINE

## 2025-03-17 PROCEDURE — 99214 OFFICE O/P EST MOD 30 MIN: CPT | Performed by: INTERNAL MEDICINE

## 2025-03-17 PROCEDURE — 1123F ACP DISCUSS/DSCN MKR DOCD: CPT | Performed by: INTERNAL MEDICINE

## 2025-03-17 PROCEDURE — 3046F HEMOGLOBIN A1C LEVEL >9.0%: CPT | Performed by: INTERNAL MEDICINE

## 2025-03-17 PROCEDURE — 3075F SYST BP GE 130 - 139MM HG: CPT | Performed by: INTERNAL MEDICINE

## 2025-03-17 PROCEDURE — 4004F PT TOBACCO SCREEN RCVD TLK: CPT | Performed by: INTERNAL MEDICINE

## 2025-03-17 PROCEDURE — 3078F DIAST BP <80 MM HG: CPT | Performed by: INTERNAL MEDICINE

## 2025-03-17 PROCEDURE — G8417 CALC BMI ABV UP PARAM F/U: HCPCS | Performed by: INTERNAL MEDICINE

## 2025-03-17 SDOH — ECONOMIC STABILITY: FOOD INSECURITY: WITHIN THE PAST 12 MONTHS, YOU WORRIED THAT YOUR FOOD WOULD RUN OUT BEFORE YOU GOT MONEY TO BUY MORE.: NEVER TRUE

## 2025-03-17 SDOH — ECONOMIC STABILITY: FOOD INSECURITY: WITHIN THE PAST 12 MONTHS, THE FOOD YOU BOUGHT JUST DIDN'T LAST AND YOU DIDN'T HAVE MONEY TO GET MORE.: NEVER TRUE

## 2025-03-17 NOTE — PROGRESS NOTES
Medicare Annual Wellness Visit    Wesly Barrientos is here for Medicare AWV    Assessment & Plan   Medicare annual wellness visit, subsequent  Consider COVID-19 booster.  Otherwise is up-to-date on preventative services.       No follow-ups on file.     Subjective   The following acute and/or chronic problems were also addressed today:  See attached note    Patient's complete Health Risk Assessment and screening values have been reviewed and are found in Flowsheets. The following problems were reviewed today and where indicated follow up appointments were made and/or referrals ordered.    Positive Risk Factor Screenings with Interventions:    Fall Risk:  Do you feel unsteady or are you worried about falling? : (!) (Proxy-Rptd) yes  2 or more falls in past year?: (!) (Proxy-Rptd) yes  Fall with injury in past year?: (Proxy-Rptd) no     Interventions:    Reviewed medications, home hazards, visual acuity, and co-morbidities that can increase risk for falls    Cognitive:   Clock Drawing Test (CDT): (!) Abnormal  Words recalled: 3 Words Recalled  Total Score: 3  Total Score Interpretation: Normal Mini-Cog  Interventions:  Medicated.           General HRA Questions:  Select all that apply: (!) (Proxy-Rptd) Loneliness  Interventions - Loneliness:  See above        Abnormal BMI (obese):  Body mass index is 32.61 kg/m². (!) Abnormal  Interventions:  low carbohydrate diet          Vision Screen:  Do you have difficulty driving, watching TV, or doing any of your daily activities because of your eyesight?: (Proxy-Rptd) No  Have you had an eye exam within the past year?: (!) (Proxy-Rptd) No  Interventions:   Patient declines any further evaluation or treatment     ADL's:   Patient reports needing help with:  Select all that apply: (!) (Proxy-Rptd) Banking/Finances  Interventions:  See above     Tobacco Use:    Tobacco Use      Smoking status: Every Day        Types: Cigars        Passive exposure: Current      Smokeless tobacco:

## 2025-03-17 NOTE — PROGRESS NOTES
Identified pt with two pt identifiers(name and ). Reviewed record in preparation for visit and have obtained necessary documentation. All patient medications has been reviewed.  Chief Complaint   Patient presents with    Medicare AWV       Health Maintenance Due   Topic    Diabetic Alb to Cr ratio (uACR) test     COVID-19 Vaccine ( season)    Lipids     Annual Wellness Visit (Medicare)      Health Maintenance Review: Patient reminded of \"due or due soon\" health maintenance. I have asked the patient to contact his/her primary care provider (PCP) for follow-up on his/her health maintenance.    Wt Readings from Last 3 Encounters:   25 115.2 kg (254 lb)   24 113.9 kg (251 lb)   10/11/24 110.7 kg (244 lb)     Temp Readings from Last 3 Encounters:   25 97.4 °F (36.3 °C)   24 97.4 °F (36.3 °C) (Temporal)   10/11/24 98.1 °F (36.7 °C) (Oral)     BP Readings from Last 3 Encounters:   25 137/77   24 137/73   10/11/24 134/76     Pulse Readings from Last 3 Encounters:   25 70   24 77   10/11/24 69       1. \"Have you been to the ER, urgent care clinic since your last visit?  Hospitalized since your last visit?\" No    2. \"Have you seen or consulted any other health care providers outside of the [facility] since your last visit?\" No     3. For patients aged 45-75: Has the patient had a colonoscopy / FIT/ Cologuard? NA - based on age    Patient is accompanied by self I have received verbal consent from Wesly Barrientos to discuss any/all medical information while they are present in the room.

## 2025-03-22 DIAGNOSIS — E11.42 TYPE 2 DIABETES MELLITUS WITH DIABETIC POLYNEUROPATHY, UNSPECIFIED WHETHER LONG TERM INSULIN USE (HCC): ICD-10-CM

## 2025-03-23 ENCOUNTER — RESULTS FOLLOW-UP (OUTPATIENT)
Facility: CLINIC | Age: 80
End: 2025-03-23

## 2025-03-24 RX ORDER — BLOOD SUGAR DIAGNOSTIC
STRIP MISCELLANEOUS
Qty: 100 STRIP | Refills: 2 | Status: SHIPPED | OUTPATIENT
Start: 2025-03-24

## 2025-03-27 LAB
ALBUMIN SERPL ELPH-MCNC: 4.2 G/DL (ref 2.9–4.4)
ALBUMIN/GLOB SERPL: 1.6 (ref 0.7–1.7)
ALPHA1 GLOB SERPL ELPH-MCNC: 0.2 G/DL (ref 0–0.4)
ALPHA2 GLOB SERPL ELPH-MCNC: 0.8 G/DL (ref 0.4–1)
B-GLOBULIN SERPL ELPH-MCNC: 1.1 G/DL (ref 0.7–1.3)
GAMMA GLOB SERPL ELPH-MCNC: 0.7 G/DL (ref 0.4–1.8)
GLOBULIN SER-MCNC: 2.8 G/DL (ref 2.2–3.9)
IGA SERPL-MCNC: 273 MG/DL (ref 61–437)
IGG SERPL-MCNC: 1153 MG/DL (ref 603–1613)
IGM SERPL-MCNC: 23 MG/DL (ref 15–143)
INTERPRETATION SERPL IEP-IMP: NORMAL
M PROTEIN SERPL ELPH-MCNC: NORMAL G/DL
PROT SERPL-MCNC: 7 G/DL (ref 6–8.5)

## 2025-05-05 ENCOUNTER — TELEPHONE (OUTPATIENT)
Facility: CLINIC | Age: 80
End: 2025-05-05

## 2025-05-05 DIAGNOSIS — D50.0 ANEMIA, BLOOD LOSS: ICD-10-CM

## 2025-05-05 RX ORDER — FERROUS SULFATE 325(65) MG
1 TABLET ORAL 2 TIMES DAILY
Qty: 180 TABLET | Refills: 1 | Status: SHIPPED | OUTPATIENT
Start: 2025-05-05

## 2025-05-05 NOTE — TELEPHONE ENCOUNTER
Medication Refill Request    Wesly Barrientos is requesting a refill of the following medication(s):    Disp Refills Start End    ferrous sulfate (IRON 325) 325 (65 Fe) MG tablet 180 tablet 1 1/23/2025 --    Sig - Route: Take 1 tablet by mouth 2 times daily - Oral      Please send refill to:     [facility]/pharmacy #[id number] - [place], [place] - [address] [facility] -  [phone] - F [phone] [address] [facility] [address]  Phone: [phone] Fax: [phone]    Patient can be reached at [phone]

## 2025-06-19 DIAGNOSIS — E11.29 TYPE 2 DIABETES MELLITUS WITH OTHER DIABETIC KIDNEY COMPLICATION (HCC): ICD-10-CM

## 2025-06-19 RX ORDER — GLIPIZIDE 10 MG/1
10 TABLET ORAL 2 TIMES DAILY
Qty: 180 TABLET | Refills: 1 | Status: SHIPPED | OUTPATIENT
Start: 2025-06-19

## 2025-08-04 DIAGNOSIS — D50.0 ANEMIA, BLOOD LOSS: ICD-10-CM

## 2025-08-06 RX ORDER — FERROUS SULFATE 325(65) MG
1 TABLET ORAL 2 TIMES DAILY
Qty: 180 TABLET | Refills: 1 | Status: SHIPPED | OUTPATIENT
Start: 2025-08-06